# Patient Record
Sex: FEMALE | Race: WHITE | Employment: FULL TIME
[De-identification: names, ages, dates, MRNs, and addresses within clinical notes are randomized per-mention and may not be internally consistent; named-entity substitution may affect disease eponyms.]

---

## 2017-10-15 ENCOUNTER — HEALTH MAINTENANCE LETTER (OUTPATIENT)
Age: 37
End: 2017-10-15

## 2020-07-30 ENCOUNTER — MEDICAL CORRESPONDENCE (OUTPATIENT)
Dept: HEALTH INFORMATION MANAGEMENT | Facility: CLINIC | Age: 40
End: 2020-07-30

## 2020-09-28 ENCOUNTER — HOSPITAL ENCOUNTER (OUTPATIENT)
Dept: PHYSICAL THERAPY | Facility: CLINIC | Age: 40
Setting detail: THERAPIES SERIES
End: 2020-09-28
Attending: ORTHOPAEDIC SURGERY
Payer: COMMERCIAL

## 2020-09-28 PROCEDURE — 97162 PT EVAL MOD COMPLEX 30 MIN: CPT | Mod: GP | Performed by: PHYSICAL THERAPIST

## 2020-09-28 PROCEDURE — 97110 THERAPEUTIC EXERCISES: CPT | Mod: GP | Performed by: PHYSICAL THERAPIST

## 2020-09-28 NOTE — PROGRESS NOTES
"   09/28/20 0917   General Information   Type of Visit Initial OP Ortho PT Evaluation   Start of Care Date 09/28/20   Referring Physician Dr. Calvin Allen MD   Patient/Family Goals Statement \"Get the pain to go away\"    Orders Evaluate and Treat   Orders Comment 1-2 times per week, for 4-6 weeks.  Please send progress report.  Evaluate and treat patient as needed and initiate a home exercise program.   Date of Order 07/30/20   Certification Required? Yes   Medical Diagnosis Right knee, IT band friction syndrome   Surgical/Medical history reviewed Yes   Weight-Bearing Status - LUE full weight-bearing   Weight-Bearing Status - RUE full weight-bearing   Weight-Bearing Status - LLE full weight-bearing   Weight-Bearing Status - RLE full weight-bearing   General Information Comments No PMH on file.  Patient reports 13 knee surgeries (6 on R, 7 on L).       Present No   Body Part(s)   Body Part(s) Knee   Presentation and Etiology   Pertinent history of current problem (include personal factors and/or comorbidities that impact the POC) Patient reports pain that started in July, goes from outside of R knee all the way up to her hip.  She has a long history of knee pain, starting in middle school when she was playing floor hockey and was checked in her knee.  She reports it's genetic, as her father and grandparents had knee issues as well.  She injured her R knee in April 2019 when walking on a trail and stepped on an uneven part and dislocated the knee.  She popped it back it place (it had dislocated many times before, so she was used to popping it back in place).  Patient states she underwent R TKA in August 2019, but chart review indicates surgery took place on 6/28/2019.  She was seen for PT in [place] until about Feb-March 2020, said she tried many different things including a hinge brace and shock therapy (TENS maybe?).  Since her surgery she has not been able to straight or bend her knee all the " "way.  The most flexion she ever got was 95 deg, the most extension she ever got was 7 deg from 0.  She continues to have swelling and pain.  In July she started having pain shooting up the lateral aspect of her R leg and into her hip.  At first she thought it was a problem with the hip, but her doctor told her it was the IT band.  This is the first time she's had pain in this location, in the past it's always been confined to the knee.  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     Impairments A. Pain;B. Decreased WB tolerance;C. Swelling;D. Decreased ROM;E. Decreased flexibility;F. Decreased strength and endurance;G. Impaired balance;H. Impaired gait   Functional Limitations perform activities of daily living;perform required work activities;perform desired leisure / sports activities   Symptom Location R lateral joint line extending up lateral leg to R hip   How/Where did it occur Other  (Unknown)   Onset date of current episode/exacerbation 07/30/20   Chronicity Chronic   Pain rating (0-10 point scale) Best (/10);Worst (/10)  (Currently 4/10)   Best (/10) 4/10   Worst (/10) 10/10   Pain quality H. Other   Pain quality comment \"Feels like the knee swells to where I really start limping.  Pain is sharp\"   Frequency of pain/symptoms A. Constant   Pain/symptoms are: Other   Pain symptoms comment \"Gets worse throughout the day as I'm on it\"   Pain/symptoms exacerbated by M. Other   Pain exacerbation comment Walking and moving, stairs (bad going up and down), does not kneel or squat (has an order saying this)   Pain/symptoms eased by K. Other   Pain eased by comment Ice/heat, rubbing the area, ibuprofen    Progression of symptoms since onset: Worsened   Prior Level of Function   Prior Level of Function-Mobility Independent   Prior Level of Function-ADLs Independent   Current Level of Function   Patient role/employment history G. Disabled  (Was a CNA, but now on " "disability )   Living environment [facility]   Home/community accessibility Trailer house that is handicapped accessible (for boyfriend's uncle who lives with her and her boyfriend)   Current equipment-Gait/Locomotion None  (Supposed to be using a cane but lost it when she moved)   Current equipment-ADL None   Fall Risk Screen   Fall screen completed by PT   Have you fallen 2 or more times in the past year? Yes   Have you fallen and had an injury in the past year? Yes   Is patient a fall risk? Yes   Fall screen comments \"A lot of falls - the last one put me in a boot for 3 weeks (that was in August)\"   Functional Scales   Functional Scales Other   Other Scales  LEFS  (Score 32/80 (40%))   Knee Objective Findings   Side (if bilateral, select both right and left) Right   Observation Patient appears to be frustrated with her knee pain, frustrated with her lack of progress after her surgery, frustrated with the pain she is currently in.    Integumentary  Intact, surgical scar present on R knee   Gait/Locomotion Midly antalgic, decreased R knee extension, increased stance time R LE as compared to L LE, bilateral overpronation, R knee tends to turn inward (L knee does not)   Foot Position In Standing Bilateral overpronation with bilateral decreased arch height   Knee ROM Comment Clicking in R knee during knee flexion/extension when patient was sitting   Knee/Hip Strength Comments R hip flexion 4-/5 and painful when tested    Knee Special Test Comments Positive Jaleel's, Positive Sommer's compression test (both indicating IT band dysfunction)   Palpation Tender along R IT band, tender to palpation of R greater trochanter, R glutes, tender with palpation of R femoral condyle, tender to palpation of lateral joint line.  No tenderness with palpation of medial joint line or areas superior to patella   Accessory Motion/Joint Mobility Patella movement WNL   Right Knee Extension AROM -5 deg (sitting)   Right Knee Flexion AROM 74 " deg (sitting), 90 deg (supine)   Right Knee Flexion PROM 95 deg (supine), very painful    Right Knee Flexion Strength 4-/5 (within patient's range), painful when testing    Right Knee Extension Strength 4-/5 (within patient's range), painful when testing    Right Hip Abduction Strength 3/5   Right Hamstring Flexibility Increased tightness   Right Hip Flexor Flexibility Increased tightness   Right ITB Flexibility Increased tightness   Planned Therapy Interventions   Planned Therapy Interventions balance training;gait training;joint mobilization;manual therapy;neuromuscular re-education;ROM;strengthening;stretching   Planned Modality Interventions   Planned Modality Interventions Cryotherapy;Hot packs;Ultrasound;TENS   Planned Modality Interventions Comments Modalities as needed for symptom relief    Clinical Impression   Criteria for Skilled Therapeutic Interventions Met yes, treatment indicated   PT Diagnosis R LE pain, decreased R LE strength, increased muscle tightness, gait deviations, R knee swelling   Influenced by the following impairments R LE pain, decreased R LE strength, increased muscle tightness, gait deviations, R knee swelling   Functional limitations due to impairments Unable to return to work, unable to squat/kneel, difficulty with ambulation, standing for long periods, ascending/descending stairs   Clinical Presentation Evolving/Changing   Clinical Presentation Rationale Based on observation, history, evaluation and clinical judgment.    Clinical Decision Making (Complexity) Moderate complexity   Therapy Frequency 2 times/Week   Predicted Duration of Therapy Intervention (days/wks) 90 days    Risk & Benefits of therapy have been explained Yes   Patient, Family & other staff in agreement with plan of care Yes   Clinical Impression Comments Patient presents with signs and symptoms consistent with referring diagnosis of right knee pain and right IT band friction syndrome.  She demonstrates R LE pain,  decreased R LE strength, increased muscle tightness, gait deviations, R knee swelling.  Functionally, she has increased pain and difficulty with ascending/descending stairs, standing for long periods of time, ambulation, squatting/kneeling, and she is unable to return to work (as a CNA - is currently on disability).  Patient will benefit from skilled physical therapy interventions to address physical impairments for return to functional activities.    Education Assessment   Preferred Learning Style Listening;Reading;Demonstration;Pictures/video   Barriers to Learning No barriers   ORTHO GOALS   PT Ortho Eval Goals 1;2;3   Ortho Goal 1   Goal Identifier 1   Goal Description Patient will increase LEFS score from 32/80 (40%) to at least 72/80 in order to demonstrate at least 90% maximal function in order to facilitate return to prior level of function.     Target Date 12/27/20   Ortho Goal 2   Goal Identifier 2   Goal Description Patient will report R LE pain at < 2/10 with all activities in order to facilitate return to PLOF.   Target Date 12/27/20   Ortho Goal 3   Goal Identifier 3   Goal Description Patient will increase R knee extension AROM from 5 deg to 0 deg and will improve R knee flexion AROM from 90 deg to 110 deg (in supine) in order to facilitate return to PLOF.   Target Date 12/27/20   Total Evaluation Time   PT Eval, Moderate Complexity Minutes (68321) 50   Therapy Certification   Certification date from 09/28/20   Certification date to 12/27/20   Medical Diagnosis Right knee, IT band friction syndrome           Karma Grant, PT, DPT, CLT-ANÍBAL  [facility]  Physical Therapist     Phone: [phone]  Email: [email or web address]

## 2020-09-28 NOTE — PROGRESS NOTES
[facility]          OUTPATIENT PHYSICAL THERAPY ORTHOPEDIC EVALUATION  PLAN OF TREATMENT FOR OUTPATIENT REHABILITATION  (COMPLETE FOR INITIAL CLAIMS ONLY)  Patient's Last Name, First Name, M.I.  YOB: 1980  Talia Lemuse  DARBY    Provider s Name:  [facility]   Medical Record No.  [id number]   Start of Care Date:  09/28/20   Onset Date:  07/30/20   Type:     _X__PT   ___OT   ___SLP Medical Diagnosis:  Right knee, IT band friction syndrome     PT Diagnosis:  R LE pain, decreased R LE strength, increased muscle tightness, gait deviations, R knee swelling   Visits from SOC:  1      _________________________________________________________________________________  Plan of Treatment/Functional Goals:  balance training, gait training, joint mobilization, manual therapy, neuromuscular re-education, ROM, strengthening, stretching     Cryotherapy, Hot packs, Ultrasound, TENS  Modalities as needed for symptom relief   Goals  Goal Identifier: 1  Goal Description: Patient will increase LEFS score from 32/80 (40%) to at least 72/80 in order to demonstrate at least 90% maximal function in order to facilitate return to prior level of function.    Target Date: 12/27/20    Goal Identifier: 2  Goal Description: Patient will report R LE pain at < 2/10 with all activities in order to facilitate return to PLOF.  Target Date: 12/27/20    Goal Identifier: 3  Goal Description: Patient will increase R knee extension AROM from 5 deg to 0 deg and will improve R knee flexion AROM from 90 deg to 110 deg (in supine) in order to facilitate return to PLOF.  Target Date: 12/27/20       Therapy Frequency:  2 times/Week  Predicted Duration of Therapy Intervention:  90 days     Karma Grant, PT, DPT, CLT-ANÍBAL                 I CERTIFY THE NEED FOR THESE SERVICES FURNISHED UNDER        THIS PLAN OF TREATMENT AND WHILE UNDER MY CARE .             Physician Signature                Date    X_____________________________________________________                             Certification Date From:  09/28/20   Certification Date To:  12/27/20    Referring Provider:  Dr. Calvin Allen MD    Initial Assessment        See Epic Evaluation Start of Care Date: 09/28/20

## 2020-09-30 ENCOUNTER — HOSPITAL ENCOUNTER (OUTPATIENT)
Dept: PHYSICAL THERAPY | Facility: CLINIC | Age: 40
Setting detail: THERAPIES SERIES
End: 2020-09-30
Attending: ORTHOPAEDIC SURGERY
Payer: COMMERCIAL

## 2020-09-30 PROCEDURE — 97110 THERAPEUTIC EXERCISES: CPT | Mod: GP

## 2020-10-07 ENCOUNTER — HOSPITAL ENCOUNTER (OUTPATIENT)
Dept: PHYSICAL THERAPY | Facility: CLINIC | Age: 40
Setting detail: THERAPIES SERIES
End: 2020-10-07
Attending: ORTHOPAEDIC SURGERY
Payer: COMMERCIAL

## 2020-10-07 PROCEDURE — 97112 NEUROMUSCULAR REEDUCATION: CPT | Mod: GP | Performed by: PHYSICAL THERAPIST

## 2020-10-07 PROCEDURE — 97110 THERAPEUTIC EXERCISES: CPT | Mod: GP | Performed by: PHYSICAL THERAPIST

## 2020-10-12 ENCOUNTER — HOSPITAL ENCOUNTER (OUTPATIENT)
Dept: PHYSICAL THERAPY | Facility: CLINIC | Age: 40
Setting detail: THERAPIES SERIES
End: 2020-10-12
Attending: ORTHOPAEDIC SURGERY
Payer: COMMERCIAL

## 2020-10-12 PROCEDURE — 97140 MANUAL THERAPY 1/> REGIONS: CPT | Mod: GP | Performed by: PHYSICAL THERAPIST

## 2020-10-12 PROCEDURE — 97110 THERAPEUTIC EXERCISES: CPT | Mod: GP | Performed by: PHYSICAL THERAPIST

## 2020-10-12 PROCEDURE — 97112 NEUROMUSCULAR REEDUCATION: CPT | Mod: GP | Performed by: PHYSICAL THERAPIST

## 2020-10-14 ENCOUNTER — HOSPITAL ENCOUNTER (OUTPATIENT)
Dept: PHYSICAL THERAPY | Facility: CLINIC | Age: 40
Setting detail: THERAPIES SERIES
End: 2020-10-14
Attending: ORTHOPAEDIC SURGERY
Payer: COMMERCIAL

## 2020-10-14 PROCEDURE — 97140 MANUAL THERAPY 1/> REGIONS: CPT | Mod: GP | Performed by: PHYSICAL THERAPIST

## 2020-10-14 PROCEDURE — 97110 THERAPEUTIC EXERCISES: CPT | Mod: GP | Performed by: PHYSICAL THERAPIST

## 2020-10-19 ENCOUNTER — HOSPITAL ENCOUNTER (OUTPATIENT)
Dept: PHYSICAL THERAPY | Facility: CLINIC | Age: 40
Setting detail: THERAPIES SERIES
End: 2020-10-19
Attending: ORTHOPAEDIC SURGERY
Payer: COMMERCIAL

## 2020-10-19 PROCEDURE — 97140 MANUAL THERAPY 1/> REGIONS: CPT | Mod: GP | Performed by: PHYSICAL THERAPIST

## 2020-10-19 PROCEDURE — 97110 THERAPEUTIC EXERCISES: CPT | Mod: GP | Performed by: PHYSICAL THERAPIST

## 2020-11-16 ENCOUNTER — MEDICAL CORRESPONDENCE (OUTPATIENT)
Dept: HEALTH INFORMATION MANAGEMENT | Facility: CLINIC | Age: 40
End: 2020-11-16

## 2020-11-17 ENCOUNTER — HOSPITAL ENCOUNTER (OUTPATIENT)
Dept: PHYSICAL THERAPY | Facility: CLINIC | Age: 40
Setting detail: THERAPIES SERIES
End: 2020-11-17
Attending: ORTHOPAEDIC SURGERY
Payer: COMMERCIAL

## 2020-11-17 PROCEDURE — 97161 PT EVAL LOW COMPLEX 20 MIN: CPT | Mod: GP

## 2020-11-17 PROCEDURE — 97110 THERAPEUTIC EXERCISES: CPT | Mod: GP

## 2020-11-17 NOTE — PROGRESS NOTES
[facility]          OUTPATIENT PHYSICAL THERAPY ORTHOPEDIC EVALUATION  PLAN OF TREATMENT FOR OUTPATIENT REHABILITATION  (COMPLETE FOR INITIAL CLAIMS ONLY)  Patient's Last Name, First Name, M.I.  YOB: 1980  Eneida Lemus    Provider s Name:  [facility]   Medical Record No.  [id number]   Start of Care Date:  11/17/20   Onset Date:  11/16/20   Type:     _X__PT   ___OT   ___SLP Medical Diagnosis:  s/p right arthroscopic debridement of arthrofibrosis, lateral release of patella and cortisone injection     PT Diagnosis:  s/p R knee arthroscopic debridgement w/decreased RLE strength, impaired gait mechanics, decreased proprioception, decreased R knee ROM, increased tissue tightness, and decreased activity tolerance.   Visits from SOC:  1      _________________________________________________________________________________  Plan of Treatment/Functional Goals:  balance training, gait training, joint mobilization, manual therapy, neuromuscular re-education, ROM, strengthening, stretching  as indicated  Cryotherapy, Hot packs, TENS, Ultrasound  as needed  Goals  Goal Identifier: LEFS  Goal Description: Patient will increase LEFS score from 40/80 (50%) to at least 72/80 in order to demonstrate at least 90% maximal function in order to facilitate return to prior level of function.    Target Date: 02/14/21    Goal Identifier: HEP  Goal Description: Pt will demonstrate accurate compliance to HEP >4 days per week to faciliate safe discharge from PT  Target Date: 02/14/21    Goal Identifier: Pain  Goal Description: Pt will report knee pain consistently <2/10 with all activities to show improved activity tolerance to return to prior level of function  Target Date: 02/14/21    Goal Identifier: Ambulation  Goal Description: Pt will be able to ambulate for >30 min w/symmetrical gait pattern and no assistive device to be able to comfortably run errands  Target Date:  02/14/21    Therapy Frequency:  2 times/Week  Predicted Duration of Therapy Intervention:  3 months    Gena Franco, PT                 I CERTIFY THE NEED FOR THESE SERVICES FURNISHED UNDER        THIS PLAN OF TREATMENT AND WHILE UNDER MY CARE .             Physician Signature               Date    X_____________________________________________________                             Certification Date From:  11/17/20   Certification Date To:  02/14/21    Referring Provider:  Calvin Allen MD    Initial Assessment        See Epic Evaluation Start of Care Date: 11/17/20

## 2020-11-17 NOTE — PROGRESS NOTES
11/17/20 0930   General Information   Type of Visit Initial OP Ortho PT Evaluation   Start of Care Date 11/17/20   Referring Physician Calvin Allen MD   Patient/Family Goals Statement get rid of knee pain and be able to do what I need to do   Orders Evaluate and Treat   Orders Comment daily therapy x2wks, 2-3x per wk x2 wks. Please send progress report   Date of Order 11/16/20   Certification Required? Yes   Medical Diagnosis s/p right arthroscopic debridement of arthrofibrosis, lateral release of patella and cortisone injection   Surgical/Medical history reviewed Yes   Precautions/Limitations no known precautions/limitations   Weight-Bearing Status - LUE full weight-bearing   Weight-Bearing Status - RUE full weight-bearing   Weight-Bearing Status - LLE full weight-bearing   Weight-Bearing Status - RLE weight-bearing as tolerated   Special Instructions No squatting, no kneeling, general TKA restrictions as reported by pt   General Information Comments PMH/PSH: not on file but pt reports 14 knee surgeries at this time. Most recently R arthroscopic surgery on R TKA from last year       Present No   Body Part(s)   Body Part(s) Knee   Presentation and Etiology   Pertinent history of current problem (include personal factors and/or comorbidities that impact the POC) Pt had R TKA last year. Thought it was going well initially but had two manipulations after that. Did further PT to help with pain and motion, did not help. Had arthroscopic surgery yesterday (11/16/2020).   Impairments A. Pain;C. Swelling;B. Decreased WB tolerance;G. Impaired balance;H. Impaired gait;F. Decreased strength and endurance;D. Decreased ROM;E. Decreased flexibility   Functional Limitations perform activities of daily living;perform required work activities;perform desired leisure / sports activities   Symptom Location R knee, generally whole thing today and some pain going up lateral R thigh   How/Where did it occur    (surgery)   Onset date of current episode/exacerbation 11/16/20   Chronicity New   Pain rating (0-10 point scale)   (currently 3/10)   Worst (/10) 5-6/10 prior to surgery   Pain quality A. Sharp;C. Aching   Frequency of pain/symptoms A. Constant   Pain/symptoms are: The same all the time   Pain/symptoms exacerbated by B. Walking;G. Certain positions   Pain/symptoms eased by I. OTC medication(s);H. Cold;E. 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 Yes   Have you fallen and had an injury in the past year? Yes   Is patient a fall risk? Yes;Department fall risk interventions implemented   Fall screen comments Pt reports general trips and falls, sometimes related to knee pain, other times she isn't sure what causes them   Abuse Screen (yes response referral indicated)   Feels Unsafe at Home or Work/School no   Feels Threatened by Someone no   Does Anyone Try to Keep You From Having Contact with Others or Doing Things Outside Your Home? no   Physical Signs of Abuse Present no   Functional Scales   Other Scales  LEFS  (40/80)   Knee Objective Findings   Side (if bilateral, select both right and left) Right   Observation Pt reports that she feels nauseous today from the surgery. Ambulating w/o assistive device although she reports that the surgeon said she should be using a cane after this surgery.    Integumentary  R knee wrapped in ace wrap, inspected above  and below, no increased redness, some swelling but nothing more than anticipated   Gait/Locomotion decreased stance time on RLE, mildly antalgic, decreased R knee ROM (somewhat limited due to ace wrap)   Foot Position In Standing bilateral over pronation   Right Knee Extension AROM lacking 8 deg   Right Knee Flexion AROM 86 deg in supine, very tight feeling   Knee ROM Comment ROM limited due to ace wrap in place   Right Knee Flexion Strength not tested due to surgery being yesterday   Right Knee Extension Strength not tested due to surgery being yesterday   Right Hip Abduction Strength not formally tested but difficulty with sidelying hip abd, increased hip flex compensation   Right Hamstring Flexibility increased tightness   Right Hip Flexor Flexibility increased tightness   Knee Special Test Comments Special tests not done due to post surgical status   Palpation Slightly tender along R ITB, gentle pressure on wrap at knee was okay   Right Knee Extension PROM lacking 5 deg   Right Quad Set Strength able to complete SLR for 5 reps before demonstrating ext lag   Posture weight shifted onto LLE, rounded shoulders, forward head position   Balance/Proprioception (Single Leg Stance) SLS not tested due to post surgical status, tandem frankie required light UE support to maintain   R VMO Strength decreased   Right Gastrocnemius Flexibility WFL   Right Quadricep Flexibility not tested due to pain with knee flexion but anticipate increased tightness   Planned Therapy Interventions   Planned Therapy Interventions balance training;gait training;joint mobilization;manual therapy;neuromuscular re-education;ROM;strengthening;stretching   Planned Therapy Interventions Comment as indicated   Planned Modality Interventions   Planned Modality Interventions Cryotherapy;Hot packs;TENS;Ultrasound   Planned Modality Interventions Comments as needed   Clinical Impression   Criteria for Skilled Therapeutic Interventions Met yes, treatment  indicated   PT Diagnosis s/p R knee arthroscopic debridgement w/decreased RLE strength, impaired gait mechanics, decreased proprioception, decreased R knee ROM, increased tissue tightness, and decreased activity tolerance.   Influenced by the following impairments s/p R knee arthroscopic debridgement w/decreased RLE strength, impaired gait mechanics, decreased proprioception, decreased R knee ROM, increased tissue tightness, and decreased activity tolerance.   Functional limitations due to impairments walking longer distances, working, standing, sitting for longer periods   Clinical Presentation Evolving/Changing   Clinical Presentation Rationale Based on observation, history, evaluation and clinical judgment.    Clinical Decision Making (Complexity) Moderate complexity   Therapy Frequency 2 times/Week   Predicted Duration of Therapy Intervention (days/wks) 3 months   Risk & Benefits of therapy have been explained Yes   Patient, Family & other staff in agreement with plan of care Yes   Clinical Impression Comments Pt is a 40 year old female that presents 1 day s/p R arthroscopic debridgement of arthrofibrosis, lateral release of patella, and cortisone injection. This is about a year after the original TKA and 2 manipulations. Pt is one day post op, ace wrap still in place. Reports tolerable pain levels at this time. Ambulating w/o assistive device. Pt presents with decreased R knee ROM, RLE weakness, tissue tightness, impaired gait mechaincs, decreased proprioception, and decreased activity tolerance. Pt will benefit from skilled PT intervention to address these impairments and improve her level of function   Education Assessment   Preferred Learning Style Listening;Reading;Demonstration;Pictures/video   Barriers to Learning No barriers   ORTHO GOALS   PT Ortho Eval Goals 1;2;3;4   Ortho Goal 1   Goal Identifier LEFS   Goal Description Patient will increase LEFS score from 40/80 (50%) to at least 72/80 in order to  demonstrate at least 90% maximal function in order to facilitate return to prior level of function.     Target Date 02/14/21   Ortho Goal 2   Goal Identifier HEP   Goal Description Pt will demonstrate accurate compliance to HEP >4 days per week to faciliate safe discharge from PT   Target Date 02/14/21   Ortho Goal 3   Goal Identifier Pain   Goal Description Pt will report knee pain consistently <2/10 with all activities to show improved activity tolerance to return to prior level of function   Target Date 02/14/21   Ortho Goal 4   Goal Identifier Ambulation   Goal Description Pt will be able to ambulate for >30 min w/symmetrical gait pattern and no assistive device to be able to comfortably run errands   Target Date 02/14/21   Total Evaluation Time   PT Eval, Low Complexity Minutes (51735) 15   Therapy Certification   Certification date from 11/17/20   Certification date to 02/14/21   Medical Diagnosis s/p right arthroscopic debridement of arthrofibrosis, lateral release of patella and cortisone injection     Gena Franco, PT, DPT  [phone]  [facility]  Thank you for this referral

## 2020-11-24 ENCOUNTER — HOSPITAL ENCOUNTER (OUTPATIENT)
Dept: PHYSICAL THERAPY | Facility: CLINIC | Age: 40
Setting detail: THERAPIES SERIES
End: 2020-11-24
Attending: ORTHOPAEDIC SURGERY
Payer: COMMERCIAL

## 2020-11-24 PROCEDURE — 97110 THERAPEUTIC EXERCISES: CPT | Mod: GP | Performed by: PHYSICAL THERAPIST

## 2020-11-27 ENCOUNTER — HOSPITAL ENCOUNTER (OUTPATIENT)
Dept: PHYSICAL THERAPY | Facility: CLINIC | Age: 40
Setting detail: THERAPIES SERIES
End: 2020-11-27
Attending: ORTHOPAEDIC SURGERY
Payer: COMMERCIAL

## 2020-11-27 PROCEDURE — 97110 THERAPEUTIC EXERCISES: CPT | Mod: GP

## 2020-12-03 ENCOUNTER — HOSPITAL ENCOUNTER (OUTPATIENT)
Dept: PHYSICAL THERAPY | Facility: CLINIC | Age: 40
Setting detail: THERAPIES SERIES
End: 2020-12-03
Attending: ORTHOPAEDIC SURGERY
Payer: COMMERCIAL

## 2020-12-03 PROCEDURE — 97110 THERAPEUTIC EXERCISES: CPT | Mod: GP

## 2020-12-03 PROCEDURE — 97112 NEUROMUSCULAR REEDUCATION: CPT | Mod: GP

## 2020-12-10 ENCOUNTER — HOSPITAL ENCOUNTER (OUTPATIENT)
Dept: PHYSICAL THERAPY | Facility: CLINIC | Age: 40
Setting detail: THERAPIES SERIES
End: 2020-12-10
Attending: ORTHOPAEDIC SURGERY
Payer: COMMERCIAL

## 2020-12-10 PROCEDURE — 97110 THERAPEUTIC EXERCISES: CPT | Mod: GP

## 2020-12-17 ENCOUNTER — HOSPITAL ENCOUNTER (OUTPATIENT)
Dept: PHYSICAL THERAPY | Facility: CLINIC | Age: 40
Setting detail: THERAPIES SERIES
End: 2020-12-17
Attending: ORTHOPAEDIC SURGERY
Payer: COMMERCIAL

## 2020-12-17 PROCEDURE — 97110 THERAPEUTIC EXERCISES: CPT | Mod: GP

## 2020-12-17 PROCEDURE — 97530 THERAPEUTIC ACTIVITIES: CPT | Mod: GP

## 2020-12-29 ENCOUNTER — HOSPITAL ENCOUNTER (OUTPATIENT)
Dept: PHYSICAL THERAPY | Facility: CLINIC | Age: 40
Setting detail: THERAPIES SERIES
End: 2020-12-29
Attending: ORTHOPAEDIC SURGERY
Payer: COMMERCIAL

## 2020-12-29 PROCEDURE — 97110 THERAPEUTIC EXERCISES: CPT | Mod: GP

## 2020-12-31 ENCOUNTER — HOSPITAL ENCOUNTER (OUTPATIENT)
Dept: PHYSICAL THERAPY | Facility: CLINIC | Age: 40
Setting detail: THERAPIES SERIES
End: 2020-12-31
Attending: ORTHOPAEDIC SURGERY
Payer: COMMERCIAL

## 2020-12-31 PROCEDURE — 97140 MANUAL THERAPY 1/> REGIONS: CPT | Mod: GP

## 2020-12-31 PROCEDURE — 97110 THERAPEUTIC EXERCISES: CPT | Mod: GP

## 2021-01-05 ENCOUNTER — HOSPITAL ENCOUNTER (OUTPATIENT)
Dept: PHYSICAL THERAPY | Facility: CLINIC | Age: 41
Setting detail: THERAPIES SERIES
End: 2021-01-05
Attending: ORTHOPAEDIC SURGERY
Payer: COMMERCIAL

## 2021-01-05 PROCEDURE — 97110 THERAPEUTIC EXERCISES: CPT | Mod: GP | Performed by: PHYSICAL THERAPIST

## 2021-01-05 PROCEDURE — 97140 MANUAL THERAPY 1/> REGIONS: CPT | Mod: GP | Performed by: PHYSICAL THERAPIST

## 2021-01-07 ENCOUNTER — HOSPITAL ENCOUNTER (OUTPATIENT)
Dept: PHYSICAL THERAPY | Facility: CLINIC | Age: 41
Setting detail: THERAPIES SERIES
End: 2021-01-07
Attending: ORTHOPAEDIC SURGERY
Payer: COMMERCIAL

## 2021-01-07 PROCEDURE — 97110 THERAPEUTIC EXERCISES: CPT | Mod: GP

## 2021-01-07 PROCEDURE — 97140 MANUAL THERAPY 1/> REGIONS: CPT | Mod: GP

## 2021-01-12 ENCOUNTER — HOSPITAL ENCOUNTER (OUTPATIENT)
Dept: PHYSICAL THERAPY | Facility: CLINIC | Age: 41
Setting detail: THERAPIES SERIES
End: 2021-01-12
Attending: ORTHOPAEDIC SURGERY
Payer: COMMERCIAL

## 2021-01-12 PROCEDURE — 97110 THERAPEUTIC EXERCISES: CPT | Mod: GP

## 2021-01-14 ENCOUNTER — HOSPITAL ENCOUNTER (OUTPATIENT)
Dept: PHYSICAL THERAPY | Facility: CLINIC | Age: 41
Setting detail: THERAPIES SERIES
End: 2021-01-14
Attending: ORTHOPAEDIC SURGERY
Payer: COMMERCIAL

## 2021-01-14 PROCEDURE — 97110 THERAPEUTIC EXERCISES: CPT | Mod: GP

## 2021-01-14 PROCEDURE — 97112 NEUROMUSCULAR REEDUCATION: CPT | Mod: GP

## 2021-01-19 ENCOUNTER — HOSPITAL ENCOUNTER (OUTPATIENT)
Dept: PHYSICAL THERAPY | Facility: CLINIC | Age: 41
Setting detail: THERAPIES SERIES
End: 2021-01-19
Attending: ORTHOPAEDIC SURGERY
Payer: COMMERCIAL

## 2021-01-19 PROCEDURE — 97110 THERAPEUTIC EXERCISES: CPT | Mod: GP,95,GT

## 2021-01-19 NOTE — PROGRESS NOTES
Eneida Lemus is a 40 year old female who is being seen via a billable video visit.      Patient has given verbal consent for Video visit? Yes    Video Start Time: 9:32    Telehealth Visit Details    Type of Service:  Telehealth    Video End Time (time video stopped): 10::02    Originating Location (pt. location): Home    Additional Participants in Telehealth Visit: n/a    Distant Location (provider location):  [facility]     Mode of Communication (Audio Visual or Audio Only):  audio and visual    Gena Franco, PT  January 19, 2021

## 2021-01-26 ENCOUNTER — HOSPITAL ENCOUNTER (OUTPATIENT)
Dept: PHYSICAL THERAPY | Facility: CLINIC | Age: 41
Setting detail: THERAPIES SERIES
End: 2021-01-26
Attending: ORTHOPAEDIC SURGERY
Payer: COMMERCIAL

## 2021-01-26 PROCEDURE — 97110 THERAPEUTIC EXERCISES: CPT | Mod: GP,95,GT

## 2021-01-26 NOTE — PROGRESS NOTES
Eneida Lemus is a 40 year old female who is being seen via a billable video visit.      Patient has given verbal consent for Video visit? Yes    Video Start Time: 9:30    Telehealth Visit Details    Type of Service:  Telehealth    Video End Time (time video stopped): 9:58    Originating Location (pt. location): Home    Additional Participants in Telehealth Visit: n/a    Distant Location (provider location):  [facility]     Mode of Communication (Audio Visual or Audio Only):  audio and visual    Gena Franco, PT  January 26, 2021

## 2021-01-28 ENCOUNTER — HOSPITAL ENCOUNTER (OUTPATIENT)
Dept: PHYSICAL THERAPY | Facility: CLINIC | Age: 41
Setting detail: THERAPIES SERIES
End: 2021-01-28
Attending: ORTHOPAEDIC SURGERY
Payer: COMMERCIAL

## 2021-01-28 PROCEDURE — 97110 THERAPEUTIC EXERCISES: CPT | Mod: GP

## 2021-02-18 ENCOUNTER — HOSPITAL ENCOUNTER (OUTPATIENT)
Dept: PHYSICAL THERAPY | Facility: CLINIC | Age: 41
Setting detail: THERAPIES SERIES
End: 2021-02-18
Attending: ORTHOPAEDIC SURGERY
Payer: COMMERCIAL

## 2021-02-18 PROCEDURE — 97110 THERAPEUTIC EXERCISES: CPT | Mod: GP | Performed by: PHYSICAL THERAPIST

## 2021-02-18 NOTE — PROGRESS NOTES
Outpatient Physical Therapy Progress Note     Patient: Eneida Lemus  : 1980    Beginning/End Dates of Reporting Period:  21 to 2021    Referring Provider: Calvin Allen MD  Therapy Diagnosis: S/p R TKA     Client Self Report: (P) Pt had CT scan Pt reports she will be following up with orthopedic surgeon next .  Pt reports she has had soreness and stiffness in knee.      Objective Measurements:        Objective Measure: Pain  Details: 8/10  Objective Measure: R knee AROM  Details: 0-5-98                        Outcome Measures (most recent score):  LEFS:     Goals:  Goal Identifier LEFS   Goal Description Patient will increase LEFS score from 40/80 (50%) to at least 72/80 in order to demonstrate at least 90% maximal function in order to facilitate return to prior level of function.     Target Date 21   Date Met  (P) (14/ on )   Progress:     Goal Identifier HEP   Goal Description Pt will demonstrate accurate compliance to HEP >4 days per week to faciliate safe discharge from PT   Target Date 21   Date Met  21   Progress:     Goal Identifier Pain   Goal Description Pt will report knee pain consistently <2/10 with all activities to show improved activity tolerance to return to prior level of function   Target Date 21   Date Met  (Increased in pain since reinjury several weeks ago.)   Progress:     Goal Identifier Ambulation   Goal Description Pt will be able to ambulate for >30 min w/symmetrical gait pattern and no assistive device to be able to comfortably run errands   Target Date 21   Date Met  (Had demonstrated improvement prior to reinjury.)   Progress:     Goal Identifier     Goal Description     Target Date     Date Met      Progress:     Goal Identifier     Goal Description     Target Date     Date Met      Progress:     Goal Identifier     Goal Description     Target Date     Date Met      Progress:     Goal Identifier     Goal  Description     Target Date     Date Met      Progress:     Progress Toward Goals:   Progress limited due to reinjury of knee.      Plan:  Continue therapy per current plan of care.  Pt to follow up with surgeon to assess extent of reinjury and determine if new TKA is necessary.

## 2021-02-18 NOTE — PROGRESS NOTES
[facility]          OUTPATIENT PHYSICAL THERAPY ORTHOPEDIC EVALUATION  PLAN OF TREATMENT FOR OUTPATIENT REHABILITATION  (COMPLETE FOR INITIAL CLAIMS ONLY)  Patient's Last Name, First Name, M.I.  YOB: 1980  Eneida Lemus    Provider s Name:  [facility]   Medical Record No.  [id number]   Start of Care Date:   11/17/20   Onset Date:   11/16/20   Type:     _X__PT   ___OT   ___SLP Medical Diagnosis:   s/p right arthroscopic debridement of arthrofibrosis, lateral release of patella and cortisone injection     PT Diagnosis:  s/p R knee arthroscopic debridgement w/decreased RLE strength, impaired gait mechanics, decreased proprioception, decreased R knee ROM, increased tissue tightness, and decreased activity tolerance.   Visits from [facility]:  16      _________________________________________________________________________________  Plan of Treatment/Functional Goals:              Goals  Goal Identifier: LEFS  Goal Description: Patient will increase LEFS score from 40/80 (50%) to at least 72/80 in order to demonstrate at least 90% maximal function in order to facilitate return to prior level of function.    Target Date: 02/14/21  Current: 14/80    Goal Identifier: HEP  Goal Description: Pt will demonstrate accurate compliance to HEP >4 days per week to faciliate safe discharge from PT  Target Date: 02/14/21 MET    Goal Identifier: Pain  Goal Description: Pt will report knee pain consistently <2/10 with all activities to show improved activity tolerance to return to prior level of function  Target Date: 02/14/21   Current: Pt reports 8/10 intermittently since re-injury.    Goal Identifier: Ambulation  Goal Description: Pt will be able to ambulate for >30 min w/symmetrical gait pattern and no assistive device to be able to comfortably run errands  Target Date: 02/14/21.  Had been making progress prior to reinjury.                Therapy Frequency:    1x/week.  Predicted Duration of Therapy Intervention:   8 weeks    Darrick Burnett, PT                 I CERTIFY THE NEED FOR THESE SERVICES FURNISHED UNDER        THIS PLAN OF TREATMENT AND WHILE UNDER MY CARE .             Physician Signature               Date    X_____________________________________________________                             Certification Date From:    2/16/21  Certification Date To:   3/30/21    Referring Provider:   Dr. Calvin Allen    Initial Assessment        See Epic Evaluation

## 2021-03-04 NOTE — PROGRESS NOTES
Outpatient Physical Therapy Discharge Note     Patient: Eneida Lemus  : 1980    Beginning/End Dates of Reporting Period:  2020 to 3/4/2021    Referring Provider: Calvin Allen MD    Therapy Diagnosis: s/p R knee arthroscopic debridgement w/decreased RLE strength, impaired gait mechanics, decreased proprioception, decreased R knee ROM, increased tissue tightness, and decreased activity tolerance.     Client Self Report: Pt had CT scan Pt reports she will be following up with orthopedic surgeon next .  Pt reports she has had soreness and stiffness in knee.      Objective Measurements:        Objective Measure: Pain  Details: 8/10  Objective Measure: R knee AROM  Details: 0-5-98    Goals:  Goal Identifier LEFS   Goal Description Patient will increase LEFS score from 40/80 (50%) to at least 72/80 in order to demonstrate at least 90% maximal function in order to facilitate return to prior level of function.     Target Date 21   Date Met  (14 on )   Progress:     Goal Identifier HEP   Goal Description Pt will demonstrate accurate compliance to HEP >4 days per week to faciliate safe discharge from PT   Target Date 21   Date Met  21   Progress:     Goal Identifier Pain   Goal Description Pt will report knee pain consistently <2/10 with all activities to show improved activity tolerance to return to prior level of function   Target Date 21   Date Met  (Increased in pain since reinjury several weeks ago.)   Progress:     Goal Identifier Ambulation   Goal Description Pt will be able to ambulate for >30 min w/symmetrical gait pattern and no assistive device to be able to comfortably run errands   Target Date 21   Date Met  (Had demonstrated improvement prior to reinjury.)   Progress:     Progress Toward Goals:   Progress this reporting period: See previous note for progress summary. Pt will be undergoing open knee surgery later this month for ligament  adjustments, patellar tracking, and stability. She reports that she will be in a knee immobilizer for quite some time. She will return to PT once appropriate but will need a new eval at that time.     Plan:  Discharge from therapy.    Discharge:    Reason for Discharge: Pt is undergoing open knee surgery on 3/15. Will be in knee immobilizer then re-start PT when appropriate    Equipment Issued: HEP    Discharge Plan: Other services: Surgery.

## 2021-03-30 ENCOUNTER — MEDICAL CORRESPONDENCE (OUTPATIENT)
Dept: HEALTH INFORMATION MANAGEMENT | Facility: CLINIC | Age: 41
End: 2021-03-30

## 2021-04-07 ENCOUNTER — HOSPITAL ENCOUNTER (OUTPATIENT)
Dept: PHYSICAL THERAPY | Facility: CLINIC | Age: 41
Setting detail: THERAPIES SERIES
End: 2021-04-07
Attending: ORTHOPAEDIC SURGERY
Payer: COMMERCIAL

## 2021-04-07 PROCEDURE — 97161 PT EVAL LOW COMPLEX 20 MIN: CPT | Mod: GP

## 2021-04-07 PROCEDURE — 97110 THERAPEUTIC EXERCISES: CPT | Mod: GP

## 2021-04-07 NOTE — PROGRESS NOTES
[facility]          OUTPATIENT PHYSICAL THERAPY ORTHOPEDIC EVALUATION  PLAN OF TREATMENT FOR OUTPATIENT REHABILITATION  (COMPLETE FOR INITIAL CLAIMS ONLY)  Patient's Last Name, First Name, M.I.  YOB: 1980  Eneida Armenta    Provider s Name:  [facility]   Medical Record No.  [id number]   Start of Care Date:  04/07/21   Onset Date:  03/15/21   Type:     _X__PT   ___OT   ___SLP Medical Diagnosis:  s/p R knee open retinacular release w/plication repair of medial patellar ligament     PT Diagnosis:  R knee pain, decreased ROM, decreased strength, impaired gait mechaincs, impaired proprioception, and decreased activity tolerance   Visits from [facility]:  1      _________________________________________________________________________________  Plan of Treatment/Functional Goals:  balance training, gait training, manual therapy, neuromuscular re-education, ROM, strengthening, stretching, transfer training  as needed  Cryotherapy, Hot packs, Electrical stimulation, TENS, Ultrasound  as needed  Goals  Goal Identifier: HEP  Goal Description: Pt will demonstrate accurate compliance to HEP >4 days per wk to independently progress function and manage symptoms  Target Date: 10/03/21    Goal Identifier: LEFS  Goal Description: Pt will improve score on LEFS to >65/80 to show a clinically significant improvement in tolerance for daily activity  Target Date: 10/03/21    Goal Identifier: Ambulation  Goal Description: Pt will tolerate ambulating w/o assistive device for >30 min to be able to go grocery shopping comfortably  Target Date: 08/05/21    Goal Identifier: SLS  Goal Description: Pt will tolerate SLS for >30 sec to show improved proprioception and strength on RLE to support the knee and prevent further injury  Target Date: 08/05/21    Goal Identifier: ROM  Goal Description: Pt will demonstrate R knee ROM of at least 0-0-120 to allow for functional motion  for sitting and ascending/descending the stairs  Target Date: 08/05/21    Goal Identifier: SLR  Goal Description: Pt will demonstrate >20 reps of SLR w/o knee ext lag to show improved quad strength and coordination to be able to progress towards functional strengthening  Target Date: 05/07/21      Therapy Frequency:  2 times/Week  Predicted Duration of Therapy Intervention:  6 months    Gena Franco, PT                 I CERTIFY THE NEED FOR THESE SERVICES FURNISHED UNDER        THIS PLAN OF TREATMENT AND WHILE UNDER MY CARE .             Physician Signature               Date    X_____________________________________________________                             Certification Date From:  04/07/21   Certification Date To:  07/05/21    Referring Provider:  Calvin Allen MD    Initial Assessment        See Epic Evaluation Start of Care Date: 04/07/21
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
## 2021-04-09 ENCOUNTER — HOSPITAL ENCOUNTER (OUTPATIENT)
Dept: PHYSICAL THERAPY | Facility: CLINIC | Age: 41
Setting detail: THERAPIES SERIES
End: 2021-04-09
Attending: ORTHOPAEDIC SURGERY
Payer: COMMERCIAL

## 2021-04-09 PROCEDURE — 97530 THERAPEUTIC ACTIVITIES: CPT | Mod: GP

## 2021-04-09 PROCEDURE — 97110 THERAPEUTIC EXERCISES: CPT | Mod: GP

## 2021-04-12 ENCOUNTER — HOSPITAL ENCOUNTER (OUTPATIENT)
Dept: PHYSICAL THERAPY | Facility: CLINIC | Age: 41
Setting detail: THERAPIES SERIES
End: 2021-04-12
Attending: ORTHOPAEDIC SURGERY
Payer: COMMERCIAL

## 2021-04-12 PROCEDURE — 97116 GAIT TRAINING THERAPY: CPT | Mod: GP

## 2021-04-12 PROCEDURE — 97110 THERAPEUTIC EXERCISES: CPT | Mod: GP

## 2021-04-15 ENCOUNTER — HOSPITAL ENCOUNTER (OUTPATIENT)
Dept: PHYSICAL THERAPY | Facility: CLINIC | Age: 41
Setting detail: THERAPIES SERIES
End: 2021-04-15
Attending: ORTHOPAEDIC SURGERY
Payer: COMMERCIAL

## 2021-04-15 PROCEDURE — 97110 THERAPEUTIC EXERCISES: CPT | Mod: GP

## 2021-04-15 PROCEDURE — 97140 MANUAL THERAPY 1/> REGIONS: CPT | Mod: GP

## 2021-04-27 ENCOUNTER — HOSPITAL ENCOUNTER (OUTPATIENT)
Dept: PHYSICAL THERAPY | Facility: CLINIC | Age: 41
Setting detail: THERAPIES SERIES
End: 2021-04-27
Attending: ORTHOPAEDIC SURGERY
Payer: COMMERCIAL

## 2021-04-27 PROCEDURE — 97110 THERAPEUTIC EXERCISES: CPT | Mod: GP

## 2021-04-30 ENCOUNTER — HOSPITAL ENCOUNTER (OUTPATIENT)
Dept: PHYSICAL THERAPY | Facility: CLINIC | Age: 41
Setting detail: THERAPIES SERIES
End: 2021-04-30
Attending: ORTHOPAEDIC SURGERY
Payer: COMMERCIAL

## 2021-04-30 PROCEDURE — 97110 THERAPEUTIC EXERCISES: CPT | Mod: GP

## 2021-05-03 ENCOUNTER — HOSPITAL ENCOUNTER (OUTPATIENT)
Dept: PHYSICAL THERAPY | Facility: CLINIC | Age: 41
Setting detail: THERAPIES SERIES
End: 2021-05-03
Attending: ORTHOPAEDIC SURGERY
Payer: COMMERCIAL

## 2021-05-03 PROCEDURE — 97530 THERAPEUTIC ACTIVITIES: CPT | Mod: GP,95

## 2021-05-03 PROCEDURE — 97110 THERAPEUTIC EXERCISES: CPT | Mod: GP,GT,95

## 2021-05-03 NOTE — PROGRESS NOTES
Eneida Armenta is a 40 year old female who is being seen via a billable video visit.      Patient has given verbal consent for Video visit? Yes    Video Start Time: 1:49    Telehealth Visit Details    Type of Service:  Telehealth    Video End Time (time video stopped): 2:18    Originating Location (pt. location): Home    Additional Participants in Telehealth Visit: pt's son helped hold camera    Distant Location (provider location):  [facility]     Mode of Communication (Audio Visual or Audio Only):  audio and visual    Gena Franco, PT  May 3, 2021

## 2021-05-06 ENCOUNTER — HOSPITAL ENCOUNTER (OUTPATIENT)
Dept: PHYSICAL THERAPY | Facility: CLINIC | Age: 41
Setting detail: THERAPIES SERIES
End: 2021-05-06
Attending: ORTHOPAEDIC SURGERY
Payer: COMMERCIAL

## 2021-05-06 PROCEDURE — 97110 THERAPEUTIC EXERCISES: CPT | Mod: GP

## 2021-05-06 PROCEDURE — 97140 MANUAL THERAPY 1/> REGIONS: CPT | Mod: GP

## 2021-05-17 ENCOUNTER — HOSPITAL ENCOUNTER (OUTPATIENT)
Dept: PHYSICAL THERAPY | Facility: CLINIC | Age: 41
Setting detail: THERAPIES SERIES
End: 2021-05-17
Attending: ORTHOPAEDIC SURGERY
Payer: COMMERCIAL

## 2021-05-17 PROCEDURE — 97140 MANUAL THERAPY 1/> REGIONS: CPT | Mod: GP

## 2021-05-17 PROCEDURE — 97110 THERAPEUTIC EXERCISES: CPT | Mod: GP

## 2021-05-20 ENCOUNTER — HOSPITAL ENCOUNTER (OUTPATIENT)
Dept: PHYSICAL THERAPY | Facility: CLINIC | Age: 41
Setting detail: THERAPIES SERIES
End: 2021-05-20
Attending: ORTHOPAEDIC SURGERY
Payer: COMMERCIAL

## 2021-05-20 PROCEDURE — 97140 MANUAL THERAPY 1/> REGIONS: CPT | Mod: GP

## 2021-05-20 PROCEDURE — 97110 THERAPEUTIC EXERCISES: CPT | Mod: GP

## 2021-05-27 ENCOUNTER — HOSPITAL ENCOUNTER (OUTPATIENT)
Dept: PHYSICAL THERAPY | Facility: CLINIC | Age: 41
Setting detail: THERAPIES SERIES
End: 2021-05-27
Attending: ORTHOPAEDIC SURGERY
Payer: COMMERCIAL

## 2021-05-27 PROCEDURE — 97110 THERAPEUTIC EXERCISES: CPT | Mod: GP

## 2021-05-27 PROCEDURE — 97140 MANUAL THERAPY 1/> REGIONS: CPT | Mod: GP

## 2021-06-10 ENCOUNTER — HOSPITAL ENCOUNTER (OUTPATIENT)
Dept: PHYSICAL THERAPY | Facility: CLINIC | Age: 41
Setting detail: THERAPIES SERIES
End: 2021-06-10
Attending: ORTHOPAEDIC SURGERY
Payer: COMMERCIAL

## 2021-06-10 PROCEDURE — 97110 THERAPEUTIC EXERCISES: CPT | Mod: GP

## 2021-06-16 ENCOUNTER — HOSPITAL ENCOUNTER (OUTPATIENT)
Dept: PHYSICAL THERAPY | Facility: CLINIC | Age: 41
Setting detail: THERAPIES SERIES
End: 2021-06-16
Attending: ORTHOPAEDIC SURGERY
Payer: COMMERCIAL

## 2021-06-16 PROCEDURE — 97110 THERAPEUTIC EXERCISES: CPT | Mod: GP

## 2021-06-16 NOTE — PROGRESS NOTES
Outpatient Physical Therapy Progress Note     Patient: Eneida Armenta  : 1980    Beginning/End Dates of Reporting Period:  2021 to 2021    Referring Provider: Calvin Allen MD    Therapy Diagnosis: R knee pain, decreased ROM, decreased strength, impaired gait mechaincs, impaired proprioception, and decreased activity tolerance     Client Self Report: Pt reports that overall knee has been feeling pretty good lately. More sore today due to cleaning this morning w/o using her crutches. Hip is starting to get a little more sore as well.    Objective Measurements:  Objective Measure: pain  Details: 3/10 lateral edge of patella  Objective Measure: ROM  Details: ext AROM: 7 deg  PROM: 3 deg.  Flex 97 deg  Objective Measure: LEFS ()  Details:     Goals:  Goal Identifier HEP   Goal Description Pt will demonstrate accurate compliance to HEP >4 days per wk to independently progress function and manage symptoms   Target Date 10/03/21   Date Met      Progress:     Goal Identifier LEFS   Goal Description Pt will improve score on LEFS to >65/80 to show a clinically significant improvement in tolerance for daily activity(Progressing but has not met goal yet. )   Target Date 10/03/21   Date Met      Progress:     Goal Identifier Ambulation   Goal Description Pt will tolerate ambulating w/o assistive device for >30 min to be able to go grocery shopping comfortably(goal met with crutches, waiting for clearance to be off )   Target Date 21   Date Met      Progress:     Goal Identifier SLS   Goal Description Pt will tolerate SLS for >30 sec to show improved proprioception and strength on RLE to support the knee and prevent further injury(not initiated yet)   Target Date 21   Date Met      Progress:     Goal Identifier ROM   Goal Description Pt will demonstrate R knee ROM of at least 0-0-120 to allow for functional motion for sitting and ascending/descending the stairs(currently 0-7-97  today)   Target Date 08/05/21   Date Met      Progress:     Goal Identifier SLR   Goal Description Pt will demonstrate >20 reps of SLR w/o knee ext lag to show improved quad strength and coordination to be able to progress towards functional strengthening(slight ext lag of 10 deg present on all reps)   Target Date 05/07/21   Date Met      Progress:     Progress Toward Goals:   Progress this reporting period: Eneida participates well during physical therapy and reports good compliance to her HEP. Fairly low knee pain overall, can get more irritated with increased activity. R lateral hip pain is returning, more bothersome than knee pain. She is progressing well with strength and tolerating activity well. However, at rest she is lacking 7 deg of knee ext, lacking 3 with overpressure. She can only flex knee to max of 100 deg (observed at last visit). When performing supine SLR, she has increased knee ext lag of about 10-15 deg with every rep. ROM has been stable for the last few weeks but she demonstrates enough PROM that gains can be made. She is still ambulating w/bilateral axillary crutches, due to knee ext lag w/SLR, recommend continued use until improved quad strength/weightbearing tolerance. Eneida will benefit from continued skill PT intervention to further progress strength and mobility.     Plan:  Continue therapy per current plan of care.    Discharge:  No

## 2021-06-23 ENCOUNTER — HOSPITAL ENCOUNTER (OUTPATIENT)
Dept: PHYSICAL THERAPY | Facility: CLINIC | Age: 41
Setting detail: THERAPIES SERIES
End: 2021-06-23
Attending: ORTHOPAEDIC SURGERY
Payer: COMMERCIAL

## 2021-06-23 PROCEDURE — 97140 MANUAL THERAPY 1/> REGIONS: CPT | Mod: GP

## 2021-06-23 PROCEDURE — 97110 THERAPEUTIC EXERCISES: CPT | Mod: GP

## 2021-06-30 ENCOUNTER — HOSPITAL ENCOUNTER (OUTPATIENT)
Dept: PHYSICAL THERAPY | Facility: CLINIC | Age: 41
Setting detail: THERAPIES SERIES
End: 2021-06-30
Attending: ORTHOPAEDIC SURGERY
Payer: COMMERCIAL

## 2021-06-30 PROCEDURE — 97110 THERAPEUTIC EXERCISES: CPT | Mod: GP

## 2021-07-07 ENCOUNTER — HOSPITAL ENCOUNTER (OUTPATIENT)
Dept: PHYSICAL THERAPY | Facility: CLINIC | Age: 41
Setting detail: THERAPIES SERIES
End: 2021-07-07
Attending: ORTHOPAEDIC SURGERY
Payer: COMMERCIAL

## 2021-07-07 PROCEDURE — 97032 APPL MODALITY 1+ESTIM EA 15: CPT | Mod: GP

## 2021-07-07 PROCEDURE — 97110 THERAPEUTIC EXERCISES: CPT | Mod: GP

## 2021-07-07 NOTE — PROGRESS NOTES
Outpatient Physical Therapy Progress Note     Patient: Eneida Armenta  : 1980    Beginning/End Dates of Reporting Period:  2021 to 2021    Referring Provider: Calvin Allen MD    Therapy Diagnosis: R knee pain, decreased ROM, decreased strength, impaired gait mechaincs, impaired proprioception, and decreased activity tolerance     Client Self Report: Pt reports that knee is doing pretty good. Hip continues to worsen, more swelling and increased pain at the hip. Brought NMES machine with to trial today to work on improving quad function.     Objective Measurements:  Objective Measure: pain  Details: 0/10 at the knee, 4-5/10 at lateral hip.  Objective Measure: ROM  Details: ext AROM: 6 deg  PROM: 0 deg.  Flex 100 deg    Goals:  Goal Identifier HEP   Goal Description Pt will demonstrate accurate compliance to HEP >4 days per wk to independently progress function and manage symptoms(ongoing goal, demonstrates good compliance)   Target Date 10/03/21   Date Met      Progress:     Goal Identifier LEFS   Goal Description Pt will improve score on LEFS to >65/80 to show a clinically significant improvement in tolerance for daily activity(Still limited due to crutches/activity restrictions)   Target Date 10/03/21   Date Met      Progress:     Goal Identifier Ambulation   Goal Description Pt will tolerate ambulating w/o assistive device for >30 min to be able to go grocery shopping comfortably(still requiring crutches per surgeon and quad function)   Target Date 21   Date Met      Progress:     Goal Identifier SLS   Goal Description Pt will tolerate SLS for >30 sec to show improved proprioception and strength on RLE to support the knee and prevent further injury(not tested yet due to activity restrictions)   Target Date 21   Date Met      Progress:     Goal Identifier ROM   Goal Description Pt will demonstrate R knee ROM of at least 0-0-120 to allow for functional motion for sitting and  ascending/descending the stairs(R knee ROM of 0-6-100)   Target Date 08/05/21   Date Met      Progress:     Goal Identifier SLR   Goal Description Pt will demonstrate >20 reps of SLR w/o knee ext lag to show improved quad strength and coordination to be able to progress towards functional strengthening(demonstrates knee ext lag)   Target Date 08/05/21   Date Met      Progress:     Progress Toward Goals:   Progress this reporting period: Eneida continues to be consistent with her physical therapy. She is using her crutches due to reduced quad activation. She started working with electrical stimulation to help improve it. She reports very little knee pain but does have pain at her lateral R hip. Functional strength is improving and her knee ROM is slowly improving but demonstrates some progress, currently 0-6-100 deg. Pt will benefit from continued skilled PT to further progress strength and function.    Plan:  Continue therapy per current plan of care.    Discharge:  No

## 2021-07-07 NOTE — PROGRESS NOTES
Rehabilitation Services      OUTPATIENT PHYSICAL THERAPY  PLAN OF TREATMENT FOR OUTPATIENT REHABILITATION    Patient's Last Name, First Name, M.I.                YOB: 1980  Eneida Armenta                        Provider's Name  Gena Franco, PT Medical Record No.  [id number]                               Onset Date: 03/15/2021   Start of Care Date: 04/07/2021   Type:     _X_PT   ___OT   ___SLP Medical Diagnosis: s/p R knee open retinacular release w/plication repair of medial patellar ligament                       PT Diagnosis: R knee pain, decreased ROM, decreased strength, impaired gait mechaincs, impaired proprioception, and decreased activity tolerance      _________________________________________________________________________________  Plan of Treatment: balance training, gait training, manual therapy, neuromuscular re-education, ROM, strengthening, stretching, transfer training  as needed  Cryotherapy, Hot packs, Electrical stimulation, TENS, Ultrasound  as needed    Frequency/Duration: 1x per wk for 3 months     Goals:  Goal Identifier HEP   Goal Description Pt will demonstrate accurate compliance to HEP >4 days per wk to independently progress function and manage symptoms(ongoing goal, demonstrates good compliance)   Target Date 10/03/21   Date Met      Progress:     Goal Identifier LEFS   Goal Description Pt will improve score on LEFS to >65/80 to show a clinically significant improvement in tolerance for daily activity(Still limited due to crutches/activity restrictions)   Target Date 10/03/21   Date Met      Progress:     Goal Identifier Ambulation   Goal Description Pt will tolerate ambulating w/o assistive device for >30 min to be able to go grocery shopping comfortably(still requiring crutches per surgeon and quad function)   Target Date 08/05/21   Date Met      Progress:     Goal Identifier SLS   Goal  Description Pt will tolerate SLS for >30 sec to show improved proprioception and strength on RLE to support the knee and prevent further injury(not tested yet due to activity restrictions)   Target Date 08/05/21   Date Met      Progress:     Goal Identifier ROM   Goal Description Pt will demonstrate R knee ROM of at least 0-0-120 to allow for functional motion for sitting and ascending/descending the stairs(R knee ROM of 0-6-100)   Target Date 08/05/21   Date Met      Progress:     Goal Identifier SLR   Goal Description Pt will demonstrate >20 reps of SLR w/o knee ext lag to show improved quad strength and coordination to be able to progress towards functional strengthening(demonstrates knee ext lag)   Target Date 08/05/21   Date Met      Progress:     Progress Toward Goals:   Progress this reporting period: Eneida continues to be consistent with her physical therapy. She is using her crutches due to reduced quad activation. She started working with electrical stimulation to help improve it. She reports very little knee pain but does have pain at her lateral R hip. Functional strength is improving and her knee ROM is slowly improving but demonstrates some progress, currently 0-6-100 deg. Pt will benefit from continued skilled PT to further progress strength and function.      Certification date from 7/5/2021 to 10/2/2021.    Gena Franco, PT          I CERTIFY THE NEED FOR THESE SERVICES FURNISHED UNDER        THIS PLAN OF TREATMENT AND WHILE UNDER MY CARE .             Physician Signature               Date    X_____________________________________________________                      Referring Provider: Calvin Allen MD

## 2021-07-21 ENCOUNTER — HOSPITAL ENCOUNTER (OUTPATIENT)
Dept: PHYSICAL THERAPY | Facility: CLINIC | Age: 41
Setting detail: THERAPIES SERIES
End: 2021-07-21
Attending: ORTHOPAEDIC SURGERY
Payer: COMMERCIAL

## 2021-07-21 PROCEDURE — 97112 NEUROMUSCULAR REEDUCATION: CPT | Mod: GP,95

## 2021-07-21 PROCEDURE — 97110 THERAPEUTIC EXERCISES: CPT | Mod: GP,GT

## 2021-07-21 NOTE — PROGRESS NOTES
Eneida Armenta is a 40 year old female who is being seen via a billable video visit.      Patient has given verbal consent for Video visit? Yes    Video Start Time: 8:33    Telehealth Visit Details    Type of Service:  Telehealth    Video End Time (time video stopped): 9:00    Originating Location (pt. location): Home    Additional Participants in Telehealth Visit: n/a    Distant Location (provider location):  [facility]     Mode of Communication (Audio Visual or Audio Only):  audio and visual    Gena Franco, PT  July 21, 2021

## 2021-07-29 ENCOUNTER — HOSPITAL ENCOUNTER (OUTPATIENT)
Dept: PHYSICAL THERAPY | Facility: CLINIC | Age: 41
Setting detail: THERAPIES SERIES
End: 2021-07-29
Attending: ORTHOPAEDIC SURGERY
Payer: COMMERCIAL

## 2021-07-29 PROCEDURE — 97110 THERAPEUTIC EXERCISES: CPT | Mod: GP

## 2021-07-30 ENCOUNTER — HOSPITAL ENCOUNTER (EMERGENCY)
Facility: CLINIC | Age: 41
Discharge: HOME | End: 2021-07-30
Attending: PHYSICIAN ASSISTANT | Admitting: PHYSICIAN ASSISTANT
Payer: COMMERCIAL

## 2021-07-30 VITALS
SYSTOLIC BLOOD PRESSURE: 107 MMHG | BODY MASS INDEX: 36.08 KG/M2 | DIASTOLIC BLOOD PRESSURE: 68 MMHG | RESPIRATION RATE: 16 BRPM | HEIGHT: 69 IN | HEART RATE: 56 BPM | TEMPERATURE: 98.1 F | OXYGEN SATURATION: 99 % | WEIGHT: 243.6 LBS

## 2021-07-30 DIAGNOSIS — E16.2 HYPOGLYCEMIA: ICD-10-CM

## 2021-07-30 DIAGNOSIS — E03.9 HYPOTHYROIDISM: ICD-10-CM

## 2021-07-30 LAB
ALBUMIN SERPL-MCNC: 3.6 G/DL (ref 3.4–5)
ALBUMIN UR-MCNC: NEGATIVE MG/DL
ALP SERPL-CCNC: 81 U/L (ref 40–150)
ALT SERPL W P-5'-P-CCNC: 37 U/L (ref 0–50)
ANION GAP SERPL CALCULATED.3IONS-SCNC: 5 MMOL/L (ref 3–14)
APPEARANCE UR: ABNORMAL
AST SERPL W P-5'-P-CCNC: 21 U/L (ref 0–45)
BACTERIA #/AREA URNS HPF: ABNORMAL /HPF
BASOPHILS # BLD AUTO: 0.1 10E3/UL (ref 0–0.2)
BASOPHILS NFR BLD AUTO: 1 %
BILIRUB SERPL-MCNC: 0.6 MG/DL (ref 0.2–1.3)
BILIRUB UR QL STRIP: NEGATIVE
BUN SERPL-MCNC: 14 MG/DL (ref 7–30)
CALCIUM SERPL-MCNC: 8.7 MG/DL (ref 8.5–10.1)
CHLORIDE BLD-SCNC: 107 MMOL/L (ref 94–109)
CO2 SERPL-SCNC: 26 MMOL/L (ref 20–32)
COLOR UR AUTO: YELLOW
CREAT SERPL-MCNC: 0.88 MG/DL (ref 0.52–1.04)
EOSINOPHIL # BLD AUTO: 0.2 10E3/UL (ref 0–0.7)
EOSINOPHIL NFR BLD AUTO: 3 %
ERYTHROCYTE [DISTWIDTH] IN BLOOD BY AUTOMATED COUNT: 12.8 % (ref 10–15)
GFR SERPL CREATININE-BSD FRML MDRD: 83 ML/MIN/1.73M2
GLUCOSE BLD-MCNC: 76 MG/DL (ref 70–99)
GLUCOSE UR STRIP-MCNC: NEGATIVE MG/DL
HBA1C MFR BLD: 4.8 % (ref 0–5.6)
HCT VFR BLD AUTO: 39.1 % (ref 35–47)
HGB BLD-MCNC: 12.9 G/DL (ref 11.7–15.7)
HGB UR QL STRIP: NEGATIVE
IMM GRANULOCYTES # BLD: 0.1 10E3/UL
IMM GRANULOCYTES NFR BLD: 1 %
KETONES UR STRIP-MCNC: NEGATIVE MG/DL
LEUKOCYTE ESTERASE UR QL STRIP: ABNORMAL
LIPASE SERPL-CCNC: 155 U/L (ref 73–393)
LYMPHOCYTES # BLD AUTO: 1.9 10E3/UL (ref 0.8–5.3)
LYMPHOCYTES NFR BLD AUTO: 26 %
MCH RBC QN AUTO: 33 PG (ref 26.5–33)
MCHC RBC AUTO-ENTMCNC: 33 G/DL (ref 31.5–36.5)
MCV RBC AUTO: 100 FL (ref 78–100)
MONOCYTES # BLD AUTO: 0.6 10E3/UL (ref 0–1.3)
MONOCYTES NFR BLD AUTO: 8 %
MUCOUS THREADS #/AREA URNS LPF: PRESENT /LPF
NEUTROPHILS # BLD AUTO: 4.4 10E3/UL (ref 1.6–8.3)
NEUTROPHILS NFR BLD AUTO: 61 %
NITRATE UR QL: NEGATIVE
NRBC # BLD AUTO: 0 10E3/UL
NRBC BLD AUTO-RTO: 0 /100
PH UR STRIP: 6 [PH] (ref 5–7)
PLATELET # BLD AUTO: 199 10E3/UL (ref 150–450)
POTASSIUM BLD-SCNC: 4 MMOL/L (ref 3.4–5.3)
PROT SERPL-MCNC: 7 G/DL (ref 6.8–8.8)
RBC # BLD AUTO: 3.91 10E6/UL (ref 3.8–5.2)
RBC URINE: 1 /HPF
SODIUM SERPL-SCNC: 138 MMOL/L (ref 133–144)
SP GR UR STRIP: 1.03 (ref 1–1.03)
SQUAMOUS EPITHELIAL: 14 /HPF
T4 FREE SERPL-MCNC: 0.95 NG/DL (ref 0.76–1.46)
TSH SERPL DL<=0.005 MIU/L-ACNC: 7.32 MU/L (ref 0.4–4)
UROBILINOGEN UR STRIP-MCNC: NORMAL MG/DL
WBC # BLD AUTO: 7.2 10E3/UL (ref 4–11)
WBC URINE: 4 /HPF

## 2021-07-30 PROCEDURE — 36415 COLL VENOUS BLD VENIPUNCTURE: CPT | Performed by: PHYSICIAN ASSISTANT

## 2021-07-30 PROCEDURE — 81001 URINALYSIS AUTO W/SCOPE: CPT | Performed by: PHYSICIAN ASSISTANT

## 2021-07-30 PROCEDURE — 82040 ASSAY OF SERUM ALBUMIN: CPT | Performed by: PHYSICIAN ASSISTANT

## 2021-07-30 PROCEDURE — 85025 COMPLETE CBC W/AUTO DIFF WBC: CPT | Performed by: PHYSICIAN ASSISTANT

## 2021-07-30 PROCEDURE — 99283 EMERGENCY DEPT VISIT LOW MDM: CPT

## 2021-07-30 PROCEDURE — 84439 ASSAY OF FREE THYROXINE: CPT | Performed by: PHYSICIAN ASSISTANT

## 2021-07-30 PROCEDURE — 83690 ASSAY OF LIPASE: CPT | Performed by: PHYSICIAN ASSISTANT

## 2021-07-30 PROCEDURE — 83036 HEMOGLOBIN GLYCOSYLATED A1C: CPT | Performed by: PHYSICIAN ASSISTANT

## 2021-07-30 PROCEDURE — 84443 ASSAY THYROID STIM HORMONE: CPT | Performed by: PHYSICIAN ASSISTANT

## 2021-07-30 PROCEDURE — 99283 EMERGENCY DEPT VISIT LOW MDM: CPT | Performed by: PHYSICIAN ASSISTANT

## 2021-07-30 ASSESSMENT — MIFFLIN-ST. JEOR: SCORE: 1844.34

## 2021-07-30 NOTE — ED TRIAGE NOTES
Blood sugar is 73 per patient, she is lightheaded and dizzy, she states her blood sugars usually run 80-90.  Was diagnosed with hypoglycemia 6 years ago.

## 2021-07-30 NOTE — ED PROVIDER NOTES
History     Chief Complaint   Patient presents with     Hypoglycemia     The history is provided by the patient.     Eneida Fermin is a 40 year old female who presents to the emergency department for evaluation of recent low blood sugars. The patient reports having issues with her blood sugar for the past 6 years but has no history of diabetes. She regularly checks her blood sugar since she becomes symptomatic when it goes below 90. She is unsure what triggers her hypoglycemia. Her normal when feeling well is . Yesterday her blood sugar was 73 according to her at home glucose meter. Today she was on her way to a [facility] home to do home care and felt dizzy and developed a headache. She is still feeling dizzy but notes it has lessened. Earlier today she ate a chicken salad around 1200 then had a 12 oz can of mountain dew. When she re-checked her blood sugar it was 81. Her diet has not changed recently, no increased caffeine intake, no energy drinks. Denies fevers, chills, nausea, vomiting, cough, shortness of breath, changes to taste or smell, abdominal pain, diarrhea, urinary symptoms, rashes, flank pain, dental pain. Patient is on thyroid medications for a history of graves disease. No recent dose changes. She has had radioactive ablation on her thyroid. Levels were last checked about 2 months ago.     Allergies:  No Known Allergies    Problem List:    There are no problems to display for this patient.       Past Medical History:    No past medical history on file.    Past Surgical History:    No past surgical history on file.    Family History:    No family history on file.    Social History:  Marital Status:   [2]  Social History     Tobacco Use     Smoking status: Not on file   Substance Use Topics     Alcohol use: Not on file     Drug use: Not on file        Medications:    No current outpatient medications on file.        Review of Systems   All other systems reviewed and are  "negative.      Physical Exam   BP: 118/66  Pulse: 64  Temp: 98.1  F (36.7  C)  Resp: 16  Height: 175.3 cm (5' 9\")  Weight: 110.5 kg (243 lb 9.6 oz)  SpO2: 97 %      Physical Exam  Vitals and nursing note reviewed.   Constitutional:       General: She is not in acute distress.     Appearance: Normal appearance. She is not ill-appearing or diaphoretic.   HENT:      Head: Normocephalic and atraumatic.      Right Ear: Tympanic membrane, ear canal and external ear normal.      Left Ear: Tympanic membrane and external ear normal.      Nose: Nose normal. No congestion or rhinorrhea.      Mouth/Throat:      Mouth: Mucous membranes are dry.      Pharynx: No oropharyngeal exudate.      Comments: No sign of tooth abscess.  No gingival swelling.  Eyes:      General: No visual field deficit or scleral icterus.        Right eye: No discharge.         Left eye: No discharge.      Extraocular Movements: Extraocular movements intact.      Conjunctiva/sclera: Conjunctivae normal.      Pupils: Pupils are equal, round, and reactive to light.   Neck:      Thyroid: No thyromegaly.   Cardiovascular:      Rate and Rhythm: Normal rate and regular rhythm.      Heart sounds: Normal heart sounds. No murmur heard.     Pulmonary:      Effort: Pulmonary effort is normal. No respiratory distress.      Breath sounds: Normal breath sounds. No wheezing or rales.   Chest:      Chest wall: No tenderness.   Abdominal:      General: Bowel sounds are normal. There is no distension.      Palpations: Abdomen is soft. There is no mass.      Tenderness: There is no abdominal tenderness. There is no right CVA tenderness, left CVA tenderness, guarding or rebound.   Musculoskeletal:         General: No swelling, tenderness, deformity or signs of injury. Normal range of motion.      Cervical back: Normal range of motion and neck supple. No rigidity or tenderness.      Right lower leg: No edema.      Left lower leg: No edema.   Lymphadenopathy:      Cervical: No " cervical adenopathy.   Skin:     General: Skin is warm and dry.      Capillary Refill: Capillary refill takes less than 2 seconds.      Coloration: Skin is not jaundiced or pale.      Findings: No bruising, erythema, lesion or rash.   Neurological:      General: No focal deficit present.      Mental Status: She is alert and oriented to person, place, and time. Mental status is at baseline.      GCS: GCS eye subscore is 4. GCS verbal subscore is 5. GCS motor subscore is 6.      Cranial Nerves: Cranial nerves are intact. No cranial nerve deficit or dysarthria.      Sensory: Sensation is intact. No sensory deficit.      Motor: Motor function is intact. No weakness, tremor, abnormal muscle tone or pronator drift.      Coordination: Coordination is intact. Coordination normal. Finger-Nose-Finger Test and Heel to Shin Test normal. Rapid alternating movements normal.      Gait: Gait is intact.   Psychiatric:         Behavior: Behavior normal.         Thought Content: Thought content normal.         ED Course        Procedures      Results for orders placed or performed during the hospital encounter of 07/30/21 (from the past 24 hour(s))   CBC with platelets differential    Narrative    The following orders were created for panel order CBC with platelets differential.  Procedure                               Abnormality         Status                     ---------                               -----------         ------                     CBC with platelets and d...[[id number]]  Abnormal            Final result                 Please view results for these tests on the individual orders.   Comprehensive metabolic panel   Result Value Ref Range    Sodium 138 133 - 144 mmol/L    Potassium 4.0 3.4 - 5.3 mmol/L    Chloride 107 94 - 109 mmol/L    Carbon Dioxide (CO2) 26 20 - 32 mmol/L    Anion Gap 5 3 - 14 mmol/L    Urea Nitrogen 14 7 - 30 mg/dL    Creatinine 0.88 0.52 - 1.04 mg/dL    Calcium 8.7 8.5 - 10.1 mg/dL    Glucose 76  70 - 99 mg/dL    Alkaline Phosphatase 81 40 - 150 U/L    AST 21 0 - 45 U/L    ALT 37 0 - 50 U/L    Protein Total 7.0 6.8 - 8.8 g/dL    Albumin 3.6 3.4 - 5.0 g/dL    Bilirubin Total 0.6 0.2 - 1.3 mg/dL    GFR Estimate 83 >60 mL/min/1.73m2   TSH with free T4 reflex   Result Value Ref Range    TSH 7.32 (H) 0.40 - 4.00 mU/L   Lipase   Result Value Ref Range    Lipase 155 73 - 393 U/L   Hemoglobin A1c   Result Value Ref Range    Hemoglobin A1C 4.8 0.0 - 5.6 %   CBC with platelets and differential   Result Value Ref Range    WBC Count 7.2 4.0 - 11.0 10e3/uL    RBC Count 3.91 3.80 - 5.20 10e6/uL    Hemoglobin 12.9 11.7 - 15.7 g/dL    Hematocrit 39.1 35.0 - 47.0 %     78 - 100 fL    MCH 33.0 26.5 - 33.0 pg    MCHC 33.0 31.5 - 36.5 g/dL    RDW 12.8 10.0 - 15.0 %    Platelet Count 199 150 - 450 10e3/uL    % Neutrophils 61 %    % Lymphocytes 26 %    % Monocytes 8 %    % Eosinophils 3 %    % Basophils 1 %    % Immature Granulocytes 1 %    NRBCs per 100 WBC 0 <1 /100    Absolute Neutrophils 4.4 1.6 - 8.3 10e3/uL    Absolute Lymphocytes 1.9 0.8 - 5.3 10e3/uL    Absolute Monocytes 0.6 0.0 - 1.3 10e3/uL    Absolute Eosinophils 0.2 0.0 - 0.7 10e3/uL    Absolute Basophils 0.1 0.0 - 0.2 10e3/uL    Absolute Immature Granulocytes 0.1 (H) <=0.0 10e3/uL    Absolute NRBCs 0.0 10e3/uL   T4 free   Result Value Ref Range    Free T4 0.95 0.76 - 1.46 ng/dL   UA with Microscopic reflex to Culture    Specimen: Urine, Midstream   Result Value Ref Range    Color Urine Yellow Colorless, Straw, Light Yellow, Yellow    Appearance Urine Slightly Cloudy (A) Clear    Glucose Urine Negative Negative mg/dL    Bilirubin Urine Negative Negative    Ketones Urine Negative Negative mg/dL    Specific Gravity Urine 1.026 1.003 - 1.035    Blood Urine Negative Negative    pH Urine 6.0 5.0 - 7.0    Protein Albumin Urine Negative Negative mg/dL    Urobilinogen Urine Normal Normal, 2.0 mg/dL    Nitrite Urine Negative Negative    Leukocyte Esterase Urine Small  (A) Negative    Bacteria Urine Few (A) None Seen /HPF    Mucus Urine Present (A) None Seen /LPF    RBC Urine 1 <=2 /HPF    WBC Urine 4 <=5 /HPF    Squamous Epithelials Urine 14 (H) <=1 /HPF    Narrative    Urine Culture not indicated       Medications - No data to display    Assessments & Plan (with Medical Decision Making)     Hypoglycemia  Hypothyroidism     Eneida is a 40 year old female who presents to the ED for evaluation of recent low blood sugars. Associated dizziness and headache. Patient checks her sugars at home regularly using her glucose meter. Typically her sugar runs between 90 and 120 - below 90 she becomes symptomatic. History of hypoglycemia - no diagnosis of diabetes.  She reports that her blood sugar prior to arrival was 76.  She contacted her PCP regarding this and he recommended ED evaluation.  She denies any signs or symptoms of infection.  Asymptomatic when she is here in the ED.  She has not seen a dietitian before.  Reports being told that she was not diabetic.  On exam blood pressure 107/68, temperature 98.1, pulse 56, respirations 16, oxygen saturation 99% on room air. She does not appear distressed. No visual changes. Blood work showed a glucose level of 76.  I recommended that she consume some food or sugar containing liquid, but the patient declined.  She thought that she would be fine and, especially since she was feeling okay.  She reported that she would accept my offer if her symptoms were to change during the ED visit.  TSH level was re-checked since it has been a couple of months. This returned elevated at 7.32. T4 was within normal limits, but at the lower end of normal which could be a sign that she is becoming more hypothyroid with the recent dose decrease 2-3 months ago.  Blood work was otherwise insignificant. Urine was collected - urinalysis was clear of an infection.  Signs of contamination.  Discussed her work-up in detail with her.  Hypothyroidism with need for  levothyroxine medication change discussed with her.  Given the problems with not a simple dosing regimen at her current dose, I defer this to her PCP to decide how to change this.  Encouraged follow-up in the next few days.  Diet with clean proteins and complex carbohydrates reviewed with her.  Avoid fast sugars.  Avoid excessive caffeine amounts.  Ways to treat hypoglycemia when occurs reviewed with her.  She does state that she is seeing a dietitian in 3 days, which is an excellent start.  Indications for ED return reviewed with her.  Patient was in agreement this plan was suitable for discharge.     I have reviewed the nursing notes.    I have reviewed the findings, diagnosis, plan and need for follow up with the patient.       There are no discharge medications for this patient.      Final diagnoses:   Hypoglycemia   Hypothyroidism       This document serves as a record of services personally performed by Maged Thomas PA-C*. It was created on their behalf by Jane Dickerson, a trained medical scribe. The creation of this record is based on the provider's personal observations and the statements of the patient. This document has been checked and approved by the attending provider.    Note: Chart documentation done in part with Dragon Voice Recognition software. Although reviewed after completion, some word and grammatical errors may remain.    7/30/2021   [facility] EMERGENCY DEPT     Maged Thomas PA-C  07/30/21 1928

## 2021-07-30 NOTE — DISCHARGE INSTRUCTIONS
"It was a pleasure working with you today!  I hope your condition improves rapidly!     As we discussed, I am concerned that your thyroid needs to be increased again.  I will leave this up to your primary care provider to make a decision on what dose they would like to put you on.  I generally recheck the thyroid level 6 weeks after a change to see how things balanced out.    Please focus your diet on a \"complex carbohydrates \"and good protein sources like we discussed.  I am glad that you are set up with a dietitian here next week, as the information that you learned during that appointment will be extremely valuable.  I would recommend small more frequent meals to stabilize your blood sugar.  You could consider a nutritional supplement product for days when you are in a pinch and cannot eat in a timely manner or with good balance foods.  This product is called UCAN.  You can find this online.  "

## 2021-08-05 ENCOUNTER — HOSPITAL ENCOUNTER (OUTPATIENT)
Dept: PHYSICAL THERAPY | Facility: CLINIC | Age: 41
Setting detail: THERAPIES SERIES
End: 2021-08-05
Attending: ORTHOPAEDIC SURGERY
Payer: COMMERCIAL

## 2021-08-05 PROCEDURE — 97110 THERAPEUTIC EXERCISES: CPT | Mod: GP

## 2021-08-05 PROCEDURE — 97035 APP MDLTY 1+ULTRASOUND EA 15: CPT | Mod: GP

## 2021-08-20 ENCOUNTER — HOSPITAL ENCOUNTER (OUTPATIENT)
Dept: PHYSICAL THERAPY | Facility: CLINIC | Age: 41
Setting detail: THERAPIES SERIES
End: 2021-08-20
Attending: ORTHOPAEDIC SURGERY
Payer: COMMERCIAL

## 2021-08-20 PROCEDURE — 97110 THERAPEUTIC EXERCISES: CPT | Mod: GP

## 2021-08-25 ENCOUNTER — HOSPITAL ENCOUNTER (OUTPATIENT)
Dept: PHYSICAL THERAPY | Facility: CLINIC | Age: 41
Setting detail: THERAPIES SERIES
End: 2021-08-25
Attending: ORTHOPAEDIC SURGERY
Payer: COMMERCIAL

## 2021-08-25 PROCEDURE — 97530 THERAPEUTIC ACTIVITIES: CPT | Mod: GP

## 2021-08-25 PROCEDURE — 97110 THERAPEUTIC EXERCISES: CPT | Mod: GP

## 2021-08-25 NOTE — PROGRESS NOTES
Outpatient Physical Therapy Progress Note     Patient: Eneida Fermin  : 1980    Beginning/End Dates of Reporting Period:  2021 to 2021    Referring Provider: Calvin Allen MD    Therapy Diagnosis: R knee pain, decreased ROM, decreased strength, impaired gait mechaincs, impaired proprioception, and decreased activity tolerance     Client Self Report: Pt reports that she had a fall on Saturday. She had a drop in her blood sugar and fell in the hallway    Objective Measurements:  Objective Measure: pain  Details: 6/10 at knee, really no pain at the hip today  Objective Measure: ROM  Details: ext AROM:  lacking 7 deg  PROM: 0 deg.  Flex 92 deg - impacted due to increase in swelling today  Objective Measure: LEFS ()  Details: (P)  - decreased due to recent fall    Goals:  Goal Identifier HEP   Goal Description Pt will demonstrate accurate compliance to HEP >4 days per wk to independently progress function and manage symptoms   Target Date 10/03/21   Date Met      Progress (detail required for progress note): reports good compliance, ongoing goal     Goal Identifier LEFS   Goal Description Pt will improve score on LEFS to >65/80 to show a clinically significant improvement in tolerance for daily activity   Target Date 10/03/21   Date Met      Progress (detail required for progress note): decreased at this time due to recent fall     Goal Identifier Ambulation   Goal Description Pt will tolerate ambulating w/o assistive device for >30 min to be able to go grocery shopping comfortably   Target Date 21   Date Met      Progress (detail required for progress note): still using 1 crutch due to decreased quad strength but improving     Goal Identifier SLS   Goal Description Pt will tolerate SLS for >30 sec to show improved proprioception and strength on RLE to support the knee and prevent further injury   Target Date 21   Date Met      Progress (detail required for progress note):  not initiated yet due to quad strength and restrictions     Goal Identifier ROM   Goal Description Pt will demonstrate R knee ROM of at least 0-0-120 to allow for functional motion for sitting and ascending/descending the stairs   Target Date (P) 10/24/21   Date Met      Progress (detail required for progress note): was progressing to 0-6-100 prior to recent fall     Goal Identifier SLR   Goal Description Pt will demonstrate >20 reps of SLR w/o knee ext lag to show improved quad strength and coordination to be able to progress towards functional strengthening   Target Date (P) 10/24/21   Date Met      Progress (detail required for progress note): progressing, minimal ext lag after about 10 reps compared to starting position     Progress: Eneida was making good progress, slowly improving with quad strength and ROM. She had a fall last Saturday due to low blood sugars which has caused increased pain in posterior knee as well as lateral edge of patella. Pt reports that it feels slightly less stable. Due to being more painful and the knee being slightly more swollen, her ROM and function were decreased today. Prior to the fall, pt was tolerating SLR with improving ext lag and quad function continued to progress. Pt will benefit from continued skilled PT to further progress strength and function.     Plan:  Continue therapy per current plan of care.    Discharge:  No

## 2021-09-01 ENCOUNTER — HOSPITAL ENCOUNTER (OUTPATIENT)
Dept: PHYSICAL THERAPY | Facility: CLINIC | Age: 41
Setting detail: THERAPIES SERIES
End: 2021-09-01
Attending: ORTHOPAEDIC SURGERY
Payer: COMMERCIAL

## 2021-09-01 PROCEDURE — 97110 THERAPEUTIC EXERCISES: CPT | Mod: GP

## 2021-09-01 PROCEDURE — 97140 MANUAL THERAPY 1/> REGIONS: CPT | Mod: GP

## 2021-09-07 NOTE — PROGRESS NOTES
Outpatient Physical Therapy Discharge Note     Patient: Eneida Fermin  : 1980    Beginning/End Dates of Reporting Period:  2020 to 10/19/2020    Referring Provider: Dr. Calvin Allen MD    Therapy Diagnosis: R LE pain, decreased R LE strength, increased muscle tightness, gait deviations, R knee swelling      Client Self Report: She reports an increase in her knee symptoms with cleaning over the weekend. She pushed through.     Objective Measurements:        Objective Measure: Pain  Details: 7-8/10  Objective Measure: R knee ROM       Goals:  Goal Identifier 1   Goal Description Patient will increase LEFS score from 32/80 (40%) to at least 72/80 in order to demonstrate at least 90% maximal function in order to facilitate return to prior level of function.     Target Date 20   Date Met      Progress (detail required for progress note):       Goal Identifier 2   Goal Description Patient will report R LE pain at < 2/10 with all activities in order to facilitate return to PLOF.   Target Date 20   Date Met      Progress (detail required for progress note):       Goal Identifier 3   Goal Description Patient will increase R knee extension AROM from 5 deg to 0 deg and will improve R knee flexion AROM from 90 deg to 110 deg (in supine) in order to facilitate return to PLOF.   Target Date 20   Date Met      Progress (detail required for progress note):               Plan:  Discharge from therapy.    Discharge:    Reason for Discharge: Change in medical status.  Patient underwent TKA in 2020 and re-started PT with another therapist.      Equipment Issued: N/A    Discharge Plan: Patient to continue home program.        Karma Grant PT, DPT, CLT-ANÍBAL  [facility]  Physical Therapist     Phone: [phone]  Email: [email or web address]

## 2021-11-11 ENCOUNTER — TRANSFERRED RECORDS (OUTPATIENT)
Dept: HEALTH INFORMATION MANAGEMENT | Facility: CLINIC | Age: 41
End: 2021-11-11
Payer: COMMERCIAL

## 2022-01-03 ENCOUNTER — TRANSFERRED RECORDS (OUTPATIENT)
Dept: HEALTH INFORMATION MANAGEMENT | Facility: CLINIC | Age: 42
End: 2022-01-03
Payer: COMMERCIAL

## 2022-01-18 ENCOUNTER — MEDICAL CORRESPONDENCE (OUTPATIENT)
Dept: HEALTH INFORMATION MANAGEMENT | Facility: CLINIC | Age: 42
End: 2022-01-18
Payer: COMMERCIAL

## 2022-01-24 ENCOUNTER — HOSPITAL ENCOUNTER (OUTPATIENT)
Dept: PHYSICAL THERAPY | Facility: CLINIC | Age: 42
Setting detail: THERAPIES SERIES
End: 2022-01-24
Attending: ORTHOPAEDIC SURGERY
Payer: COMMERCIAL

## 2022-01-24 PROCEDURE — 97112 NEUROMUSCULAR REEDUCATION: CPT | Mod: GP | Performed by: PHYSICAL THERAPIST

## 2022-01-24 PROCEDURE — 97162 PT EVAL MOD COMPLEX 30 MIN: CPT | Mod: GP | Performed by: PHYSICAL THERAPIST

## 2022-01-25 NOTE — PROGRESS NOTES
[facility]    OUTPATIENT PHYSICAL THERAPY ORTHOPEDIC EVALUATION  PLAN OF TREATMENT FOR OUTPATIENT REHABILITATION  (COMPLETE FOR INITIAL CLAIMS ONLY)  Patient's Last Name, First Name, M.I.  YOB: 1980  Eneida Fermin    Provider s Name:  [facility]   Medical Record No.  [id number]   Start of Care Date:  01/24/22   Onset Date:  01/03/22   Type:     _X__PT   ___OT   ___SLP Medical Diagnosis:  Right hip, greater trochanteric bursitis; SP right knee ITB release on 1-3-2022     PT Diagnosis:  R hip pain with positive SIJ tests   Visits from SOC:  1      _________________________________________________________________________________  Plan of Treatment/Functional Goals:  gait training,manual therapy,neuromuscular re-education,strengthening,transfer training,ROM,stretching        as needed  Goals  Goal Identifier: Calming symptoms  Goal Description: Eneida will be able to use positioning, pain education, and activities to calm her symptoms to 15/10 allowing her to increase her activity level  Target Date: 03/18/22    Goal Identifier: Walking  Goal Description: Eneida will be able to walk 1/4 mile with 1-2/10 increase in symptoms   Target Date: 02/24/22    Goal Identifier: Job  Goal Description: Eneida will be able to stand, walk and complete other job tasks and improve her LEFS score to 20/80 indicating improved function  Target Date: 03/25/22          Therapy Frequency:     Predicted Duration of Therapy Intervention:  1-2 times per week x 6 weeks    Constanza Gonzales, PT                 I CERTIFY THE NEED FOR THESE SERVICES FURNISHED UNDER        THIS PLAN OF TREATMENT AND WHILE UNDER MY CARE .             Physician Signature               Date    X_____________________________________________________                             Certification Date From:  01/25/22    Certification Date To:  03/08/22    Referring Provider:  Calvin Allen MD    Initial Assessment        See Epic Evaluation Start of Care Date: 01/24/22
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
## 2022-01-26 ENCOUNTER — HOSPITAL ENCOUNTER (OUTPATIENT)
Dept: PHYSICAL THERAPY | Facility: CLINIC | Age: 42
Setting detail: THERAPIES SERIES
End: 2022-01-26
Attending: ORTHOPAEDIC SURGERY
Payer: COMMERCIAL

## 2022-01-26 PROCEDURE — 97112 NEUROMUSCULAR REEDUCATION: CPT | Mod: GP | Performed by: PHYSICAL THERAPIST

## 2022-01-26 PROCEDURE — 97140 MANUAL THERAPY 1/> REGIONS: CPT | Mod: GP | Performed by: PHYSICAL THERAPIST

## 2022-02-01 ENCOUNTER — HOSPITAL ENCOUNTER (OUTPATIENT)
Dept: PHYSICAL THERAPY | Facility: CLINIC | Age: 42
Setting detail: THERAPIES SERIES
End: 2022-02-01
Attending: ORTHOPAEDIC SURGERY
Payer: COMMERCIAL

## 2022-02-01 PROCEDURE — 97140 MANUAL THERAPY 1/> REGIONS: CPT | Mod: GP | Performed by: PHYSICAL THERAPIST

## 2022-02-01 PROCEDURE — 97112 NEUROMUSCULAR REEDUCATION: CPT | Mod: GP | Performed by: PHYSICAL THERAPIST

## 2022-02-08 ENCOUNTER — HOSPITAL ENCOUNTER (OUTPATIENT)
Dept: PHYSICAL THERAPY | Facility: CLINIC | Age: 42
Setting detail: THERAPIES SERIES
End: 2022-02-08
Attending: ORTHOPAEDIC SURGERY
Payer: COMMERCIAL

## 2022-02-08 PROCEDURE — 97140 MANUAL THERAPY 1/> REGIONS: CPT | Mod: GP | Performed by: PHYSICAL THERAPIST

## 2022-02-08 PROCEDURE — 97110 THERAPEUTIC EXERCISES: CPT | Mod: GP | Performed by: PHYSICAL THERAPIST

## 2022-03-01 ENCOUNTER — TRANSFERRED RECORDS (OUTPATIENT)
Dept: HEALTH INFORMATION MANAGEMENT | Facility: CLINIC | Age: 42
End: 2022-03-01
Payer: COMMERCIAL

## 2022-03-16 ENCOUNTER — OFFICE VISIT (OUTPATIENT)
Dept: FAMILY MEDICINE | Facility: CLINIC | Age: 42
End: 2022-03-16
Payer: COMMERCIAL

## 2022-03-16 VITALS
HEART RATE: 99 BPM | SYSTOLIC BLOOD PRESSURE: 104 MMHG | WEIGHT: 262.4 LBS | DIASTOLIC BLOOD PRESSURE: 64 MMHG | BODY MASS INDEX: 38.75 KG/M2 | RESPIRATION RATE: 10 BRPM | OXYGEN SATURATION: 97 % | TEMPERATURE: 98.3 F

## 2022-03-16 DIAGNOSIS — M53.3 SI (SACROILIAC) JOINT DYSFUNCTION: ICD-10-CM

## 2022-03-16 DIAGNOSIS — M70.61 GREATER TROCHANTERIC BURSITIS OF RIGHT HIP: ICD-10-CM

## 2022-03-16 DIAGNOSIS — M76.31 IT BAND SYNDROME, RIGHT: Primary | ICD-10-CM

## 2022-03-16 PROCEDURE — 99203 OFFICE O/P NEW LOW 30 MIN: CPT | Performed by: STUDENT IN AN ORGANIZED HEALTH CARE EDUCATION/TRAINING PROGRAM

## 2022-03-16 RX ORDER — PROCHLORPERAZINE 25 MG/1
SUPPOSITORY RECTAL
COMMUNITY
Start: 2021-10-27

## 2022-03-16 RX ORDER — IBUPROFEN 800 MG/1
800 TABLET, FILM COATED ORAL
COMMUNITY

## 2022-03-16 RX ORDER — LEVOTHYROXINE SODIUM 150 UG/1
150 TABLET ORAL
COMMUNITY
Start: 2021-11-01

## 2022-03-16 RX ORDER — FLUTICASONE PROPIONATE 220 UG/1
AEROSOL, METERED RESPIRATORY (INHALATION)
COMMUNITY
Start: 2021-07-17

## 2022-03-16 RX ORDER — NORTRIPTYLINE HCL 25 MG
25 CAPSULE ORAL AT BEDTIME
COMMUNITY

## 2022-03-16 RX ORDER — ALBUTEROL SULFATE 90 UG/1
AEROSOL, METERED RESPIRATORY (INHALATION)
COMMUNITY
Start: 2022-03-07

## 2022-03-16 RX ORDER — ALBUTEROL SULFATE 0.83 MG/ML
SOLUTION RESPIRATORY (INHALATION)
COMMUNITY
Start: 2021-12-13

## 2022-03-16 ASSESSMENT — ENCOUNTER SYMPTOMS: BACK PAIN: 1

## 2022-03-16 ASSESSMENT — PAIN SCALES - GENERAL: PAINLEVEL: WORST PAIN (10)

## 2022-03-16 NOTE — PROGRESS NOTES
"  Assessment & Plan     It band syndrome, right    Greater trochanteric bursitis of right hip    SI (sacroiliac) joint dysfunction    It does sound like patient has had no improvement with physical therapy but unsure of previous procedures or what plan was by [facility] orthopedics.  Release of information obtained today to see outside records.  Will likely need referral to orthopedics for second opinion given her complex history.  Does sound like previous imaging was not concerning for her back to be contributing to her symptoms.  I do wonder if her SI joint is at least partially contributing.  I would likely recommend physical therapy for her SI joint as patient is likely compensating given her IT band but I do think she will ultimately need orthopedic referral.       BMI:   Estimated body mass index is 38.75 kg/m  as calculated from the following:    Height as of 7/30/21: 1.753 m (5' 9\").    Weight as of this encounter: 119 kg (262 lb 6.4 oz).     Follow-up with previous orthopedic records.  Will consider referral to orthopedics.    Roverto Barrera MD  [facility]    Julianne Monique is a 41 year old who presents for the following health issues     Back Pain     History of Present Illness       Back Pain:  She presents for follow up of back pain. Patient's back pain is a recurring problem.  Location of back pain:  Right lower back and right hip  Description of back pain: sharp and shooting  Back pain spreads: right thigh    Since patient first noticed back pain, pain is: unchanged  Does back pain interfere with her job:  Yes      Reason for visit:  Pain  Symptoms include:  Serve lower right pain and goes into the hip. Hip is also swollen  Symptom intensity:  Severe  Symptom progression:  Staying the same  Had these symptoms before:  Yes  Has tried/received treatment for these symptoms:  Yes  Previous treatment was successful:  No  What makes it worse:  Walking and standing  What " makes it better:  No    She eats 4 or more servings of fruits and vegetables daily.She consumes 1 sweetened beverage(s) daily.She exercises with enough effort to increase her heart rate 10 to 19 minutes per day.  She exercises with enough effort to increase her heart rate 3 or less days per week.   She is taking medications regularly.     Patient previously following with [facility] orthopedics and unable to view any records today.  Reports previous history of right knee replacement after trouble with patellar dislocations.  Tells me she did have trouble with alignment after that needed correction procedure and physical therapy.  Now for the last year she has had trouble with right hip and leg pain as well as low back pain on that side.  Gait has been affected.  She was seen by orthopedics who recommended IT band release due to IT band syndrome.  Also had greater trochanteric injections done with no benefit.  After surgery she did see physical therapy and has not had any improvement.  She does tell me that previous surgeon had her follow-up with a different orthopedic provider who wanted her to consider further surgical intervention.  No follow-up planned at this time with them.  She is wanting another opinion.  She does use ibuprofen for discomfort with some improvement.  Working on doing stretches and exercises at home that has helped somewhat but symptoms persistent.  No other recent injury or change for her.    Review of Systems   Musculoskeletal: Positive for back pain.      Constitutional, HEENT, cardiovascular, pulmonary, gi and gu systems are negative, except as otherwise noted.      Objective    /64   Pulse 99   Temp 98.3  F (36.8  C)   Resp 10   Wt 119 kg (262 lb 6.4 oz)   SpO2 97%   BMI 38.75 kg/m    Body mass index is 38.75 kg/m .  Physical Exam   GENERAL: healthy, alert and no distress  EYES: Eyes grossly normal to inspection, PERRL and conjunctivae and sclerae normal  HENT: hearing grossly  intact  RESP: Breathing comfortably on room air  CV: No extremity edema  MS: Right SI joint tenderness to palpation.  Does have right greater trochanteric tenderness as well as tenderness down her IT band to her knee.  Negative straight leg test.  Does have limited flexion at the knee since her replacement.  Gait abnormal with pelvic tilt favoring her right side.  SKIN: no suspicious lesions or rashes  NEURO: Normal strength and tone, mentation intact and speech normal  PSYCH: mentation appears normal, affect normal/bright

## 2022-03-25 ENCOUNTER — MYC MEDICAL ADVICE (OUTPATIENT)
Dept: FAMILY MEDICINE | Facility: CLINIC | Age: 42
End: 2022-03-25
Payer: COMMERCIAL

## 2022-03-25 DIAGNOSIS — M76.31 IT BAND SYNDROME, RIGHT: Primary | ICD-10-CM

## 2022-03-25 DIAGNOSIS — M53.3 SI (SACROILIAC) JOINT DYSFUNCTION: ICD-10-CM

## 2022-04-04 NOTE — DISCHARGE SUMMARY
[facility]    Outpatient Physical Therapy Discharge Note  Patient: Eneida Fermin  : 1980    Beginning/End Dates of Reporting Period:  3 sessions between 2022 and 2022    Referring Provider: Calvin Allen MD    Therapy Diagnosis: R hip pain with positive SIJ tests     Client Self Report: At the time of her last session, she reported:   Here with son Say. She is noting 6/10 in am and increases as day progresses reporting a max of 10/10 now vs 12+/10. Still sleeping on reclining couch. She tried arranging pillows as we discussed last session without benefit.     Objective Measurements:  At the time of her last session:  Objective Measure: Surgery   Details: 1-3-2022 without precautions  Objective Measure: Symptoms  Details: proximal incision lateral R knee, swelling about the R hip reported and distal ITB        Goals:  Goal Identifier Calming symptoms   Goal Description Eneida will be able to use positioning, pain education, and activities to calm her symptoms to 15/10 allowing her to increase her activity level (Symptoms max of 10/10 over last 1-2 wks with 6/10 am)   Target Date 22   Date Met      Progress (detail required for progress note):       Goal Identifier Walking   Goal Description Eneida will be able to walk 1/4 mile with 1-2/10 increase in symptoms  (does not feel this has changed)   Target Date 22   Date Met      Progress (detail required for progress note):       Goal Identifier Job   Goal Description Eneida will be able to stand, walk and complete other job tasks and improve her LEFS score to 20/80 indicating improved function   Target Date 22   Date Met      Progress (detail required for progress note):       Goals not recently assessed. Did not tolerate exercises/activities due to pain.       Plan:  Discharge from therapy.    Discharge:    Reason for Discharge:  She was not improving and will be having another surgery.     Equipment Issued: none     Discharge Plan: Other services: second surgery.

## 2022-04-15 NOTE — PROGRESS NOTES
[facility]    Outpatient Physical Therapy Discharge Note  Patient: Eneida Fermin  : 1980    Beginning/End Dates of Reporting Period:  21 to 21    Referring Provider: Calvin Allen MD    Therapy Diagnosis: R knee pain, decreased ROM, decreased strength, impaired gait mechaincs, impaired proprioception, and decreased activity tolerance     Client Self Report: Pt reports that she had an injection in the knee that didn't seem to make a difference. Pain has gotten a little better but still feels swollen, pulling behind the knee, still having pain along lateral edge of patella and coming around the bottom edge.    Objective Measurements:  Objective Measure: pain  Details: 5/10    Goals:  Goal Identifier HEP   Goal Description Pt will demonstrate accurate compliance to HEP >4 days per wk to independently progress function and manage symptoms   Target Date 10/03/21   Date Met      Progress (detail required for progress note): reports good compliance - ongoing goal     Goal Identifier LEFS   Goal Description Pt will improve score on LEFS to >65/80 to show a clinically significant improvement in tolerance for daily activity   Target Date 10/03/21   Date Met      Progress (detail required for progress note):       Goal Identifier Ambulation   Goal Description Pt will tolerate ambulating w/o assistive device for >30 min to be able to go grocery shopping comfortably   Target Date 21   Date Met      Progress (detail required for progress note): progressing well with quad function. Recent fall decreased tolerance for weight bearing     Goal Identifier SLS   Goal Description Pt will tolerate SLS for >30 sec to show improved proprioception and strength on RLE to support the knee and prevent further injury   Target Date 21   Date Met      Progress (detail required for progress note): has not  initiated yet due to quad function and restrictions     Goal Identifier ROM   Goal Description Pt will demonstrate R knee ROM of at least 0-0-120 to allow for functional motion for sitting and ascending/descending the stairs   Target Date 10/24/21   Date Met      Progress (detail required for progress note): decreased motion due to swelling today, was at 0-6-100 prior to fall     Goal Identifier SLR   Goal Description Pt will demonstrate >20 reps of SLR w/o knee ext lag to show improved quad strength and coordination to be able to progress towards functional strengthening   Target Date 10/24/21   Date Met      Progress (detail required for progress note): Ext lag present compared to starting position after about 10 reps       Plan:  Discharge from therapy.    Discharge:    Reason for Discharge: No further expectation of progress.    Equipment Issued: HEP    Discharge Plan: Patient to continue home program.

## 2022-04-25 ENCOUNTER — OFFICE VISIT (OUTPATIENT)
Dept: NEUROSURGERY | Facility: CLINIC | Age: 42
End: 2022-04-25
Payer: COMMERCIAL

## 2022-04-25 DIAGNOSIS — M54.16 LUMBAR RADICULOPATHY: Primary | ICD-10-CM

## 2022-04-25 PROCEDURE — 99203 OFFICE O/P NEW LOW 30 MIN: CPT | Performed by: PHYSICIAN ASSISTANT

## 2022-04-25 NOTE — LETTER
4/25/2022         RE: Eneida Fermin  [address] [address]        Dear Colleague,    Thank you for referring your patient, Eneida Fermin, to the [facility] NEUROSURGERY CLINIC [place]. Please see a copy of my visit note below.    Dr. Robert Javier  [facility]  Neurosurgery Clinic Visit      CC: back and right leg pain    Primary care Provider: No Ref-Primary, Physician    Referring Provider:  Roverto Barrera MD      Reason For Visit:   I was asked to consult on the patient for back and right leg pain..      HPI: Eneida Fermin is a 41 year old female who presents for evaluation of her chief complaint of low back and right leg pain.  She has had symptoms for the last couple of years, with no particular event or injury.  She describes pain in the low back, that radiates down the lateral aspect of her right thigh to the knee.  She has worked extensively with physical therapy while a patient at 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  She states that the pain in her right thigh has not gotten any better.  She also ended up having a knee replacement and multiple surgeries on her right knee even following that.  She feels like her right leg wants to buckle at times.  She has not had any epidural injections.  No bowel or bladder issues, or any problems with balance or coordination, with the exception of her right leg occasionally feeling like it is going to give out.      Past Medical History reviewed with patient during visit.    Past Surgical History reviewed with patient during visit.    Current Outpatient Medications   Medication     albuterol (PROVENTIL) (2.5 MG/3ML) 0.083% neb solution     blood glucose (CONTOUR NEXT TEST) test strip     Continuous Blood Gluc  (DEXCOM G6 ) ALBERT     fluticasone (FLOVENT HFA) 220 MCG/ACT inhaler     ibuprofen (ADVIL/MOTRIN) 800 MG tablet     levothyroxine  (SYNTHROID/LEVOTHROID) 150 MCG tablet     nortriptyline (PAMELOR) 25 MG capsule     VENTOLIN  (90 Base) MCG/ACT inhaler     No current facility-administered medications for this visit.       No Known Allergies    Social History     Socioeconomic History     Marital status:    Tobacco Use     Smoking status: Never Smoker     Smokeless tobacco: Never Used       No family history on file.       ROS: 10 point ROS neg other than the symptoms noted above in the HPI.    Vital Signs: There were no vitals taken for this visit.    Examination:  Constitutional:  Alert, well nourished, NAD.  HEENT: Normocephalic, atraumatic.   Pulmonary:  Without shortness of breath, normal effort.   Lymph: no lymphadenopathy to low back or LE.   Integumentary: Skin is free of rashes or lesions.   Cardiovascular:  No pitting edema of BLE.    Psych: Normal affect, no apparent distress    Neurological:  Awake  Alert  Oriented x 3  Speech clear  Cranial nerves II - XII grossly intact  Motor exam   Hip Flexor:                Right: 5/5  Left:  5/5  Hip Adductor:             Right:  5/5  Left:  5/5  Hip Abductor:             Right:  5/5  Left:  5/5  Gastroc Soleus:        Right:  5/5  Left:  5/5  Tib/Ant:                      Right:  5/5  Left:  5/5  EHL:                          Right:  5/5  Left:  5/5       Sensation normal to bilateral upper and lower extremities.    Reflexes are 2+ in the patellar and Achilles. There is no clonus. Downgoing Babinski.    Musculoskeletal:  Gait: Able to stand from a seated position. Normal non-antalgic, non-myelopathic gait.  Able to heel/toe walk without loss of balance      Imaging:       Assessment/Plan:     Right low back and right leg pain    Eneida Fermin is a 41 year old female.  I did have a discussion the patient regarding her symptoms.  She has been treated extensively both conservatively and surgically with [facility] orthopedics for her IT band syndrome.  Unfortunately, she has not  noticed any symptomatic improvement in her right leg pain.  She has also had MRI of her hip, and this does not show any acute pathology.  At this point, I did recommend obtaining a lumbar spine MRI for further evaluation.  This could be due to some nerve compression.  We will follow-up with her by phone once the MRI is completed.  She did wish to proceed with that option and voiced agreement and understanding.          Prabhjot Bah PA-C  [facility] Neurosurgery  [address] [address]    Tel [phone]  Pager [phone]      The use of Dragon/LightSquared dictation services may have been used to construct the content in this note; any grammatical or spelling errors are non-intentional. Please contact the author of this note directly if you are in need of any clarification.        Again, thank you for allowing me to participate in the care of your patient.        Sincerely,        Prabhjot Bah PA-C

## 2022-04-25 NOTE — PROGRESS NOTES
Dr. Robert Javier  [facility]  Neurosurgery Clinic Visit      CC: back and right leg pain    Primary care Provider: No Ref-Primary, Physician    Referring Provider:  Roverto Barrera MD      Reason For Visit:   I was asked to consult on the patient for back and right leg pain..      HPI: Eneida Fermin is a 41 year old female who presents for evaluation of her chief complaint of low back and right leg pain.  She has had symptoms for the last couple of years, with no particular event or injury.  She describes pain in the low back, that radiates down the lateral aspect of her right thigh to the knee.  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  She states that the pain in her right thigh has not gotten any better.  She also ended up having a knee replacement and multiple surgeries on her right knee even following that.  She feels like her right leg wants to buckle at times.  She has not had any epidural injections.  No bowel or bladder issues, or any problems with balance or coordination, with the exception of her right leg occasionally feeling like it is going to give out.      Past Medical History reviewed with patient during visit.    Past Surgical History reviewed with patient during visit.    Current Outpatient Medications   Medication     albuterol (PROVENTIL) (2.5 MG/3ML) 0.083% neb solution     blood glucose (CONTOUR NEXT TEST) test strip     Continuous Blood Gluc  (DEXCOM G6 ) ALBERT     fluticasone (FLOVENT HFA) 220 MCG/ACT inhaler     ibuprofen (ADVIL/MOTRIN) 800 MG tablet     levothyroxine (SYNTHROID/LEVOTHROID) 150 MCG tablet     nortriptyline (PAMELOR) 25 MG capsule     VENTOLIN  (90 Base) MCG/ACT inhaler     No current facility-administered medications for this visit.       No Known Allergies    Social History     Socioeconomic History      Marital status:    Tobacco Use     Smoking status: Never Smoker     Smokeless tobacco: Never Used       No family history on file.       ROS: 10 point ROS neg other than the symptoms noted above in the HPI.    Vital Signs: There were no vitals taken for this visit.    Examination:  Constitutional:  Alert, well nourished, NAD.  HEENT: Normocephalic, atraumatic.   Pulmonary:  Without shortness of breath, normal effort.   Lymph: no lymphadenopathy to low back or LE.   Integumentary: Skin is free of rashes or lesions.   Cardiovascular:  No pitting edema of BLE.    Psych: Normal affect, no apparent distress    Neurological:  Awake  Alert  Oriented x 3  Speech clear  Cranial nerves II - XII grossly intact  Motor exam   Hip Flexor:                Right: 5/5  Left:  5/5  Hip Adductor:             Right:  5/5  Left:  5/5  Hip Abductor:             Right:  5/5  Left:  5/5  Gastroc Soleus:        Right:  5/5  Left:  5/5  Tib/Ant:                      Right:  5/5  Left:  5/5  EHL:                          Right:  5/5  Left:  5/5       Sensation normal to bilateral upper and lower extremities.    Reflexes are 2+ in the patellar and Achilles. There is no clonus. Downgoing Babinski.    Musculoskeletal:  Gait: Able to stand from a seated position. Normal non-antalgic, non-myelopathic gait.  Able to heel/toe walk without loss of balance      Imaging:       Assessment/Plan:     Right low back and right leg pain    Eneida Fermin is a 41 year old female.  I did have a discussion the patient regarding her symptoms.  She has been treated extensively both conservatively and surgically with [facility] orthopedics for her IT band syndrome.  Unfortunately, she has not noticed any symptomatic improvement in her right leg pain.  She has also had MRI of her hip, and this does not show any acute pathology.  At this point, I did recommend obtaining a lumbar spine MRI for further evaluation.  This could be due to some nerve  compression.  We will follow-up with her by phone once the MRI is completed.  She did wish to proceed with that option and voiced agreement and understanding.          Prabhjot Bah PA-C  [facility] Neurosurgery  [address] [address]    Tel [phone]  Pager [phone]      The use of Dragon/[employer] dictation services may have been used to construct the content in this note; any grammatical or spelling errors are non-intentional. Please contact the author of this note directly if you are in need of any clarification.

## 2022-05-08 ENCOUNTER — HEALTH MAINTENANCE LETTER (OUTPATIENT)
Age: 42
End: 2022-05-08

## 2022-05-09 ENCOUNTER — HOSPITAL ENCOUNTER (OUTPATIENT)
Dept: MRI IMAGING | Facility: CLINIC | Age: 42
Discharge: HOME | End: 2022-05-09
Attending: PHYSICIAN ASSISTANT | Admitting: PHYSICIAN ASSISTANT
Payer: COMMERCIAL

## 2022-05-09 DIAGNOSIS — M54.16 LUMBAR RADICULOPATHY: ICD-10-CM

## 2022-05-09 PROCEDURE — 72148 MRI LUMBAR SPINE W/O DYE: CPT

## 2022-05-10 ENCOUNTER — TELEPHONE (OUTPATIENT)
Dept: NEUROSURGERY | Facility: CLINIC | Age: 42
End: 2022-05-10
Payer: COMMERCIAL

## 2022-05-10 DIAGNOSIS — M54.16 LUMBAR RADICULOPATHY: Primary | ICD-10-CM

## 2022-05-10 NOTE — TELEPHONE ENCOUNTER
Please call pt to discuss her MRI results from 5/9 she stated she would like to know what the next step should be? . Thank You Martita

## 2022-05-13 ENCOUNTER — TELEPHONE (OUTPATIENT)
Dept: PALLIATIVE MEDICINE | Facility: CLINIC | Age: 42
End: 2022-05-13
Payer: COMMERCIAL

## 2022-05-13 NOTE — TELEPHONE ENCOUNTER
Pt scheduled for MAGALIS   Date:6/3/22  Time:230pm  Dr. Fontenot    Instructed pt to have H&P and  for procedure.  Patient informed of COVID testing process.

## 2022-05-23 ENCOUNTER — TRANSFERRED RECORDS (OUTPATIENT)
Dept: SURGERY | Facility: CLINIC | Age: 42
End: 2022-05-23
Payer: COMMERCIAL

## 2022-05-26 DIAGNOSIS — Z11.59 ENCOUNTER FOR SCREENING FOR OTHER VIRAL DISEASES: Primary | ICD-10-CM

## 2022-06-01 ENCOUNTER — LAB (OUTPATIENT)
Dept: LAB | Facility: CLINIC | Age: 42
End: 2022-06-01
Payer: COMMERCIAL

## 2022-06-01 DIAGNOSIS — Z11.59 ENCOUNTER FOR SCREENING FOR OTHER VIRAL DISEASES: ICD-10-CM

## 2022-06-01 LAB — SARS-COV-2 RNA RESP QL NAA+PROBE: NEGATIVE

## 2022-06-01 PROCEDURE — U0005 INFEC AGEN DETEC AMPLI PROBE: HCPCS

## 2022-06-01 PROCEDURE — U0003 INFECTIOUS AGENT DETECTION BY NUCLEIC ACID (DNA OR RNA); SEVERE ACUTE RESPIRATORY SYNDROME CORONAVIRUS 2 (SARS-COV-2) (CORONAVIRUS DISEASE [COVID-19]), AMPLIFIED PROBE TECHNIQUE, MAKING USE OF HIGH THROUGHPUT TECHNOLOGIES AS DESCRIBED BY CMS-2020-01-R: HCPCS

## 2022-06-03 ENCOUNTER — HOSPITAL ENCOUNTER (OUTPATIENT)
Dept: GENERAL RADIOLOGY | Facility: CLINIC | Age: 42
Discharge: HOME | End: 2022-06-03
Attending: ANESTHESIOLOGY | Admitting: ANESTHESIOLOGY
Payer: COMMERCIAL

## 2022-06-03 ENCOUNTER — ANESTHESIA (OUTPATIENT)
Dept: SURGERY | Facility: CLINIC | Age: 42
End: 2022-06-03
Payer: COMMERCIAL

## 2022-06-03 ENCOUNTER — ANESTHESIA EVENT (OUTPATIENT)
Dept: SURGERY | Facility: CLINIC | Age: 42
End: 2022-06-03
Payer: COMMERCIAL

## 2022-06-03 ENCOUNTER — HOSPITAL ENCOUNTER (OUTPATIENT)
Facility: CLINIC | Age: 42
Discharge: HOME | End: 2022-06-03
Attending: ANESTHESIOLOGY | Admitting: ANESTHESIOLOGY
Payer: COMMERCIAL

## 2022-06-03 VITALS
OXYGEN SATURATION: 100 % | RESPIRATION RATE: 18 BRPM | TEMPERATURE: 98.2 F | SYSTOLIC BLOOD PRESSURE: 102 MMHG | DIASTOLIC BLOOD PRESSURE: 56 MMHG | HEART RATE: 57 BPM

## 2022-06-03 DIAGNOSIS — M54.16 LUMBAR RADICULOPATHY: ICD-10-CM

## 2022-06-03 LAB — GLUCOSE BLDC GLUCOMTR-MCNC: 79 MG/DL (ref 70–99)

## 2022-06-03 PROCEDURE — 250N000011 HC RX IP 250 OP 636: Performed by: NURSE ANESTHETIST, CERTIFIED REGISTERED

## 2022-06-03 PROCEDURE — 999N000179 XR SURGERY CARM FLUORO LESS THAN 5 MIN W STILLS: Mod: TC

## 2022-06-03 PROCEDURE — 370N000017 HC ANESTHESIA TECHNICAL FEE, PER MIN: Performed by: ANESTHESIOLOGY

## 2022-06-03 PROCEDURE — 64483 NJX AA&/STRD TFRM EPI L/S 1: CPT | Mod: RT | Performed by: ANESTHESIOLOGY

## 2022-06-03 PROCEDURE — 250N000011 HC RX IP 250 OP 636: Performed by: ANESTHESIOLOGY

## 2022-06-03 PROCEDURE — 250N000009 HC RX 250: Performed by: NURSE ANESTHETIST, CERTIFIED REGISTERED

## 2022-06-03 PROCEDURE — 82962 GLUCOSE BLOOD TEST: CPT

## 2022-06-03 RX ORDER — LIDOCAINE 40 MG/G
CREAM TOPICAL
Status: DISCONTINUED | OUTPATIENT
Start: 2022-06-03 | End: 2022-06-03 | Stop reason: HOSPADM

## 2022-06-03 RX ORDER — IOPAMIDOL 612 MG/ML
INJECTION, SOLUTION INTRATHECAL PRN
Status: DISCONTINUED | OUTPATIENT
Start: 2022-06-03 | End: 2022-06-03 | Stop reason: HOSPADM

## 2022-06-03 RX ORDER — PROPOFOL 10 MG/ML
INJECTION, EMULSION INTRAVENOUS PRN
Status: DISCONTINUED | OUTPATIENT
Start: 2022-06-03 | End: 2022-06-03

## 2022-06-03 RX ORDER — LIDOCAINE HYDROCHLORIDE 20 MG/ML
INJECTION, SOLUTION INFILTRATION; PERINEURAL PRN
Status: DISCONTINUED | OUTPATIENT
Start: 2022-06-03 | End: 2022-06-03

## 2022-06-03 RX ORDER — TRIAMCINOLONE ACETONIDE 40 MG/ML
INJECTION, SUSPENSION INTRA-ARTICULAR; INTRAMUSCULAR PRN
Status: DISCONTINUED | OUTPATIENT
Start: 2022-06-03 | End: 2022-06-03 | Stop reason: HOSPADM

## 2022-06-03 RX ADMIN — PROPOFOL 50 MG: 10 INJECTION, EMULSION INTRAVENOUS at 14:40

## 2022-06-03 RX ADMIN — LIDOCAINE HYDROCHLORIDE 50 MG: 20 INJECTION, SOLUTION INFILTRATION; PERINEURAL at 14:40

## 2022-06-03 RX ADMIN — MIDAZOLAM 2 MG: 1 INJECTION INTRAMUSCULAR; INTRAVENOUS at 14:35

## 2022-06-03 RX ADMIN — PROPOFOL 50 MG: 10 INJECTION, EMULSION INTRAVENOUS at 14:41

## 2022-06-03 ASSESSMENT — LIFESTYLE VARIABLES: TOBACCO_USE: 1

## 2022-06-03 NOTE — ANESTHESIA POSTPROCEDURE EVALUATION
Patient: Eneida Fermin    Procedure: Procedure(s):  Right Lumbar 4 - 5Transforaminal Epidural Steroid Injection       Anesthesia Type:  MAC    Note:  Disposition: Outpatient   Postop Pain Control: Uneventful            Sign Out: Well controlled pain   PONV: No   Neuro/Psych: Uneventful            Sign Out: Acceptable/Baseline neuro status   Airway/Respiratory: Uneventful            Sign Out: Acceptable/Baseline resp. status   CV/Hemodynamics: Uneventful            Sign Out: Acceptable CV status   Other NRE: NONE   DID A NON-ROUTINE EVENT OCCUR? No    Event details/Postop Comments:  Pt was happy with anesthesia care.  No complications.  I will follow up with the pt if needed.           Last vitals:  Vitals Value Taken Time   /56 06/03/22 1502   Temp     Pulse 57 06/03/22 1502   Resp 18 06/03/22 1452   SpO2 100 % 06/03/22 1503   Vitals shown include unvalidated device data.    Electronically Signed By: YARIEL Murcia CRNA  Nivia 3, 2022  3:11 PM

## 2022-06-03 NOTE — ANESTHESIA CARE TRANSFER NOTE
Patient: Eneida Fermin    Procedure: Procedure(s):  Right Lumbar 4 - 5Transforaminal Epidural Steroid Injection       Diagnosis: Lumbar radiculopathy [M54.16]  Diagnosis Additional Information: No value filed.    Anesthesia Type:   MAC     Note:    Oropharynx: oropharynx clear of all foreign objects and spontaneously breathing  Level of Consciousness: drowsy  Oxygen Supplementation: nasal cannula    Independent Airway: airway patency satisfactory and stable  Dentition: dentition unchanged  Vital Signs Stable: post-procedure vital signs reviewed and stable  Report to RN Given: handoff report given  Patient transferred to: Phase II    Handoff Report: Identifed the Patient, Identified the Reponsible Provider, Reviewed the pertinent medical history, Discussed the surgical course, Reviewed Intra-OP anesthesia mangement and issues during anesthesia, Set expectations for post-procedure period and Allowed opportunity for questions and acknowledgement of understanding      Vitals:  Vitals Value Taken Time   /65 06/03/22 1452   Temp     Pulse 56 06/03/22 1452   Resp     SpO2 100 % 06/03/22 1456   Vitals shown include unvalidated device data.    Electronically Signed By: YARIEL Murcia CRNA  Nivia 3, 2022  2:58 PM

## 2022-06-03 NOTE — ANESTHESIA PREPROCEDURE EVALUATION
Anesthesia Pre-Procedure Evaluation    Patient: Eneida Fermin   MRN: [id number] : 1980        Procedure : Procedure(s):  Right Paracentral Translaminar Epidural Steroid Injection          History reviewed. No pertinent past medical history.   History reviewed. No pertinent surgical history.   No Known Allergies   Social History     Tobacco Use     Smoking status: Never Smoker     Smokeless tobacco: Never Used   Substance Use Topics     Alcohol use: Not on file      Wt Readings from Last 1 Encounters:   22 119 kg (262 lb 6.4 oz)        Anesthesia Evaluation   Pt has had prior anesthetic. Type: General and MAC.    No history of anesthetic complications       ROS/MED HX  ENT/Pulmonary:     (+) tobacco use, Past use, asthma     Neurologic:     (+) migraines,     Cardiovascular:  - neg cardiovascular ROS     METS/Exercise Tolerance: >4 METS    Hematologic:  - neg hematologic  ROS     Musculoskeletal:  - neg musculoskeletal ROS     GI/Hepatic:  - neg GI/hepatic ROS     Renal/Genitourinary:  - neg Renal ROS     Endo:     (+) thyroid problem, hypothyroidism, Obesity,     Psychiatric/Substance Use:     (+) psychiatric history anxiety and depression     Infectious Disease:  - neg infectious disease ROS     Malignancy:  - neg malignancy ROS     Other:  - neg other ROS          Physical Exam    Airway  airway exam normal      Mallampati: II   TM distance: > 3 FB   Neck ROM: full   Mouth opening: > 3 cm    Respiratory Devices and Support         Dental  no notable dental history         Cardiovascular   cardiovascular exam normal       Rhythm and rate: regular and normal     Pulmonary   pulmonary exam normal        breath sounds clear to auscultation           OUTSIDE LABS:  CBC:   Lab Results   Component Value Date    WBC 7.2 2021    HGB 12.9 2021    HCT 39.1 2021     2021     BMP:   Lab Results   Component Value Date     2021    POTASSIUM 4.0 2021    CHLORIDE 107  07/30/2021    CO2 26 07/30/2021    BUN 14 07/30/2021    CR 0.88 07/30/2021    GLC 76 07/30/2021     COAGS: No results found for: PTT, INR, FIBR  POC: No results found for: BGM, HCG, HCGS  HEPATIC:   Lab Results   Component Value Date    ALBUMIN 3.6 07/30/2021    PROTTOTAL 7.0 07/30/2021    ALT 37 07/30/2021    AST 21 07/30/2021    ALKPHOS 81 07/30/2021    BILITOTAL 0.6 07/30/2021     OTHER:   Lab Results   Component Value Date    A1C 4.8 07/30/2021    VELASQUEZ 8.7 07/30/2021    LIPASE 155 07/30/2021    TSH 7.32 (H) 07/30/2021    T4 0.95 07/30/2021       Anesthesia Plan    ASA Status:  2   NPO Status:  NPO Appropriate    Anesthesia Type: MAC.     - Reason for MAC: Deep or markedly invasive procedure (G8)   Induction: Intravenous.   Maintenance: TIVA.        Consents    Anesthesia Plan(s) and associated risks, benefits, and realistic alternatives discussed. Questions answered and patient/representative(s) expressed understanding.    - Discussed:     - Discussed with:  Patient      - Extended Intubation/Ventilatory Support Discussed: No.      - Patient is DNR/DNI Status: No    Use of blood products discussed: No .     Postoperative Care    Pain management: Multi-modal analgesia.   PONV prophylaxis: Background Propofol Infusion     Comments:    Other Comments: The risks and benefits of anesthesia, and the alternatives where applicable, have been discussed with the patient, and they wish to proceed.            YARIEL Murcia CRNA

## 2022-06-03 NOTE — DISCHARGE INSTRUCTIONS

## 2022-06-03 NOTE — OP NOTE
PRIMARY PROBLEM: Low back pain, right buttock pain, right lower extremity pain    PROCEDURE: Right L4-5  Transforaminal Epidural Steroid Injection with fluoroscopic guidance and contrast.     PROCEDURE DETAILS: After written informed consent was obtained from the patient, the patient was escorted to the procedure room.  The patient was placed in the prone position.  A  time out  was conducted to verify patient identity, procedure to be performed, side, site, allergies and any special requirements.  The skin over the thoracolumbar region was prepped and draped in normal sterile fashion with ChloraPrep. Fluoroscopy was used to identify the neural foramen in AP view and the skin was anesthetized with 2 mL of 1% lidocaine with bicarbonate buffer. A 22-gauge Quincke spinal needle was advanced through this location and advanced under fluoroscopic guidance towards the neural foramen.  The target zone was the 6 o clock position of the pedicle.   Prior to entering the foramen, the depth of the needle was gauged with a lateral view on fluoroscopy. While still in a lateral view, the needle was slowly advanced to avoid injury to the spinal nerve.  Then, in the oblique view (approximately 28 degrees), after negative aspiration, 1.5 mL of Omnipaque contrast dye was injected revealing epidural spread without evidence of intravascular or intrathecal spread.  Then a 3cc solution of 40 mg of Triamcinolone in 2 mL of  Preservative-Free saline was slowly injected into the epidural space at each segment.  After injection of the medication, as the needle tip was withdrawn, it was flushed with local anesthetic.   The patient was monitored with blood pressure and pulse oximetry machines with the assistance of an RN throughout the procedure.  The patient was alert and responsive to questions throughout the procedure.   The patient tolerated the procedure well and was observed in the post-procedural area.  The patient was dismissed without  apparent complications.     BLOOD LOSS: < 5 cc    DIAGNOSIS:  1.  Right L4-5 disc herniation versus right SI joint dysfunction causing back pain and leg pain    PLAN:  1. Performed a right L4-5  transforaminal epidural steroid injection.  2. The patient was instructed to follow-up per Dr. Fontenot's instructions.  If today's injections not helpful I recommend a right SI joint injection as she primarily has right buttock pain with some minor radiation into the posterior thigh which is much more of a minor complaint.  Often times that comes with SI joint dysfunction.    Ellis Fontenot MD  Diplomate of the American Board of Anesthesiology, Pain Medicine

## 2022-07-26 ENCOUNTER — TELEPHONE (OUTPATIENT)
Dept: FAMILY MEDICINE | Facility: CLINIC | Age: 42
End: 2022-07-26

## 2022-07-26 NOTE — TELEPHONE ENCOUNTER
Patient calling reporting that back pain is still continuing after getting injections with Dr. Fontenot. They advised her to return to Dr. Barrera to discuss a referral.     Patient is scheduled tomorrow at 3:40pm.    SELMA ValdezN, RN

## 2022-07-27 ENCOUNTER — OFFICE VISIT (OUTPATIENT)
Dept: FAMILY MEDICINE | Facility: CLINIC | Age: 42
End: 2022-07-27
Payer: COMMERCIAL

## 2022-07-27 VITALS
SYSTOLIC BLOOD PRESSURE: 106 MMHG | BODY MASS INDEX: 38.15 KG/M2 | DIASTOLIC BLOOD PRESSURE: 64 MMHG | RESPIRATION RATE: 20 BRPM | WEIGHT: 257.56 LBS | HEART RATE: 77 BPM | OXYGEN SATURATION: 100 % | TEMPERATURE: 97.8 F | HEIGHT: 69 IN

## 2022-07-27 DIAGNOSIS — M76.31 IT BAND SYNDROME, RIGHT: ICD-10-CM

## 2022-07-27 DIAGNOSIS — M53.3 SI (SACROILIAC) JOINT DYSFUNCTION: Primary | ICD-10-CM

## 2022-07-27 PROCEDURE — 99213 OFFICE O/P EST LOW 20 MIN: CPT | Performed by: STUDENT IN AN ORGANIZED HEALTH CARE EDUCATION/TRAINING PROGRAM

## 2022-07-27 RX ORDER — LEVOTHYROXINE SODIUM 150 UG/1
150 TABLET ORAL
COMMUNITY

## 2022-07-27 RX ORDER — LEVOTHYROXINE SODIUM 25 UG/1
TABLET ORAL
COMMUNITY
Start: 2021-10-01

## 2022-07-27 RX ORDER — PROCHLORPERAZINE 25 MG/1
SUPPOSITORY RECTAL
COMMUNITY
Start: 2022-07-18

## 2022-07-27 ASSESSMENT — ENCOUNTER SYMPTOMS: BACK PAIN: 1

## 2022-07-27 ASSESSMENT — PAIN SCALES - GENERAL: PAINLEVEL: EXTREME PAIN (8)

## 2022-07-27 NOTE — H&P (VIEW-ONLY)
"  Assessment & Plan     SI (sacroiliac) joint dysfunction  Patient with previous IT band procedure. No improvement with back pain since epidural injection. I think she would benefit greatly from SI joint injection. Will plan for her to follow up with Dr. Fontenot for this now.   - IR Sacroiliac Diagnostic Injection Right    It band syndrome, right      BMI:   Estimated body mass index is 38.04 kg/m  as calculated from the following:    Height as of this encounter: 1.753 m (5' 9\").    Weight as of this encounter: 116.8 kg (257 lb 9 oz).   Weight management plan: Discussed healthy diet and exercise guidelines      Roverto Barrera MD  [facility] AZAR Monique is a 41 year old presenting for the following health issues:  Back Pain      Back Pain     History of Present Illness       Reason for visit:  Back pain    She eats 4 or more servings of fruits and vegetables daily.She consumes 0 sweetened beverage(s) daily.She exercises with enough effort to increase her heart rate 9 or less minutes per day.  She exercises with enough effort to increase her heart rate 3 or less days per week.   She is taking medications regularly.       Continues to have symptoms since previous injection.  Mostly in her right side low back.  Does still have right hip tenderness as well mostly low back that is contributing to her discomfort.    Review of Systems   Musculoskeletal: Positive for back pain.       Constitutional, HEENT, cardiovascular, pulmonary, gi and gu systems are negative, except as otherwise noted.      Objective    /64   Pulse 77   Temp 97.8  F (36.6  C) (Temporal)   Resp 20   Ht 1.753 m (5' 9\")   Wt 116.8 kg (257 lb 9 oz)   LMP  (LMP Unknown)   SpO2 100%   Breastfeeding No   BMI 38.04 kg/m    Body mass index is 38.04 kg/m .  Physical Exam   GENERAL: healthy, alert and no distress  EYES: Eyes grossly normal to inspection, PERRL and conjunctivae and sclerae normal  RESP: " Breathing normally on room air  CV: No lower extremity edema  MS: no gross musculoskeletal defects noted, right-sided IT band tenderness and greater trochanteric tenderness to palpation, no left-sided tenderness, does have mild bilateral lumbar paraspinal tenderness with significant right-sided SI joint tenderness to palpation negative straight leg bilaterally  SKIN: no suspicious lesions or rashes  NEURO: Normal strength and tone, mentation intact and speech normal  PSYCH: mentation appears normal, affect normal/bright              .  ..

## 2022-07-27 NOTE — PROGRESS NOTES
"  Assessment & Plan     SI (sacroiliac) joint dysfunction  Patient with previous IT band procedure. No improvement with back pain since epidural injection. I think she would benefit greatly from SI joint injection. Will plan for her to follow up with Dr. Fontenot for this now.   - IR Sacroiliac Diagnostic Injection Right    It band syndrome, right      BMI:   Estimated body mass index is 38.04 kg/m  as calculated from the following:    Height as of this encounter: 1.753 m (5' 9\").    Weight as of this encounter: 116.8 kg (257 lb 9 oz).   Weight management plan: Discussed healthy diet and exercise guidelines      Roverto Barrera MD  [facility] AZAR Monique is a 41 year old presenting for the following health issues:  Back Pain      Back Pain     History of Present Illness       Reason for visit:  Back pain    She eats 4 or more servings of fruits and vegetables daily.She consumes 0 sweetened beverage(s) daily.She exercises with enough effort to increase her heart rate 9 or less minutes per day.  She exercises with enough effort to increase her heart rate 3 or less days per week.   She is taking medications regularly.       Continues to have symptoms since previous injection.  Mostly in her right side low back.  Does still have right hip tenderness as well mostly low back that is contributing to her discomfort.    Review of Systems   Musculoskeletal: Positive for back pain.       Constitutional, HEENT, cardiovascular, pulmonary, gi and gu systems are negative, except as otherwise noted.      Objective    /64   Pulse 77   Temp 97.8  F (36.6  C) (Temporal)   Resp 20   Ht 1.753 m (5' 9\")   Wt 116.8 kg (257 lb 9 oz)   LMP  (LMP Unknown)   SpO2 100%   Breastfeeding No   BMI 38.04 kg/m    Body mass index is 38.04 kg/m .  Physical Exam   GENERAL: healthy, alert and no distress  EYES: Eyes grossly normal to inspection, PERRL and conjunctivae and sclerae normal  RESP: " Breathing normally on room air  CV: No lower extremity edema  MS: no gross musculoskeletal defects noted, right-sided IT band tenderness and greater trochanteric tenderness to palpation, no left-sided tenderness, does have mild bilateral lumbar paraspinal tenderness with significant right-sided SI joint tenderness to palpation negative straight leg bilaterally  SKIN: no suspicious lesions or rashes  NEURO: Normal strength and tone, mentation intact and speech normal  PSYCH: mentation appears normal, affect normal/bright              .  ..

## 2022-08-01 ENCOUNTER — TELEPHONE (OUTPATIENT)
Dept: PALLIATIVE MEDICINE | Facility: CLINIC | Age: 42
End: 2022-08-01

## 2022-08-01 ENCOUNTER — MYC MEDICAL ADVICE (OUTPATIENT)
Dept: FAMILY MEDICINE | Facility: CLINIC | Age: 42
End: 2022-08-01

## 2022-08-01 NOTE — TELEPHONE ENCOUNTER
Pt scheduled for SJI  Date: 8/25/22  Time: 230pm  Dr. Fontenot    Instructed pt to have H&P and  for procedure.  Patient informed of COVID testing process.

## 2022-08-15 NOTE — TELEPHONE ENCOUNTER
Per patient, home covid  test on 8/11/22 came back positive.  No symptoms except cough.  Ok to keep appt since it will be 14 days from test to procedure.

## 2022-08-18 ENCOUNTER — MYC MEDICAL ADVICE (OUTPATIENT)
Dept: SURGERY | Facility: CLINIC | Age: 42
End: 2022-08-18

## 2022-08-24 ENCOUNTER — ANESTHESIA EVENT (OUTPATIENT)
Dept: SURGERY | Facility: CLINIC | Age: 42
End: 2022-08-24
Payer: COMMERCIAL

## 2022-08-24 ASSESSMENT — LIFESTYLE VARIABLES: TOBACCO_USE: 1

## 2022-08-25 ENCOUNTER — HOSPITAL ENCOUNTER (OUTPATIENT)
Facility: CLINIC | Age: 42
Discharge: HOME | End: 2022-08-25
Attending: ANESTHESIOLOGY | Admitting: ANESTHESIOLOGY
Payer: COMMERCIAL

## 2022-08-25 ENCOUNTER — ANESTHESIA (OUTPATIENT)
Dept: SURGERY | Facility: CLINIC | Age: 42
End: 2022-08-25
Payer: COMMERCIAL

## 2022-08-25 ENCOUNTER — HOSPITAL ENCOUNTER (OUTPATIENT)
Dept: GENERAL RADIOLOGY | Facility: CLINIC | Age: 42
Discharge: HOME | End: 2022-08-25
Attending: ANESTHESIOLOGY | Admitting: ANESTHESIOLOGY
Payer: COMMERCIAL

## 2022-08-25 VITALS
SYSTOLIC BLOOD PRESSURE: 95 MMHG | DIASTOLIC BLOOD PRESSURE: 77 MMHG | BODY MASS INDEX: 38.06 KG/M2 | HEART RATE: 55 BPM | HEIGHT: 69 IN | WEIGHT: 257 LBS | RESPIRATION RATE: 16 BRPM | OXYGEN SATURATION: 100 % | TEMPERATURE: 97.5 F

## 2022-08-25 DIAGNOSIS — M53.3 SI (SACROILIAC) JOINT DYSFUNCTION: ICD-10-CM

## 2022-08-25 PROCEDURE — 27096 INJECT SACROILIAC JOINT: CPT | Performed by: ANESTHESIOLOGY

## 2022-08-25 PROCEDURE — 370N000017 HC ANESTHESIA TECHNICAL FEE, PER MIN: Performed by: ANESTHESIOLOGY

## 2022-08-25 PROCEDURE — 250N000011 HC RX IP 250 OP 636: Performed by: ANESTHESIOLOGY

## 2022-08-25 PROCEDURE — 999N000179 XR SURGERY CARM FLUORO LESS THAN 5 MIN W STILLS: Mod: TC

## 2022-08-25 PROCEDURE — 250N000009 HC RX 250: Performed by: ANESTHESIOLOGY

## 2022-08-25 PROCEDURE — 250N000009 HC RX 250: Performed by: NURSE ANESTHETIST, CERTIFIED REGISTERED

## 2022-08-25 PROCEDURE — 27096 INJECT SACROILIAC JOINT: CPT | Mod: RT | Performed by: ANESTHESIOLOGY

## 2022-08-25 PROCEDURE — 250N000011 HC RX IP 250 OP 636: Performed by: NURSE ANESTHETIST, CERTIFIED REGISTERED

## 2022-08-25 RX ORDER — IOPAMIDOL 612 MG/ML
INJECTION, SOLUTION INTRATHECAL PRN
Status: DISCONTINUED | OUTPATIENT
Start: 2022-08-25 | End: 2022-08-25 | Stop reason: HOSPADM

## 2022-08-25 RX ORDER — LIDOCAINE HYDROCHLORIDE 20 MG/ML
INJECTION, SOLUTION INFILTRATION; PERINEURAL PRN
Status: DISCONTINUED | OUTPATIENT
Start: 2022-08-25 | End: 2022-08-25

## 2022-08-25 RX ORDER — LIDOCAINE 40 MG/G
CREAM TOPICAL
Status: DISCONTINUED | OUTPATIENT
Start: 2022-08-25 | End: 2022-08-25 | Stop reason: HOSPADM

## 2022-08-25 RX ORDER — PROPOFOL 10 MG/ML
INJECTION, EMULSION INTRAVENOUS PRN
Status: DISCONTINUED | OUTPATIENT
Start: 2022-08-25 | End: 2022-08-25

## 2022-08-25 RX ORDER — SODIUM CHLORIDE, SODIUM LACTATE, POTASSIUM CHLORIDE, CALCIUM CHLORIDE 600; 310; 30; 20 MG/100ML; MG/100ML; MG/100ML; MG/100ML
INJECTION, SOLUTION INTRAVENOUS CONTINUOUS
Status: DISCONTINUED | OUTPATIENT
Start: 2022-08-25 | End: 2022-08-25 | Stop reason: HOSPADM

## 2022-08-25 RX ORDER — BUPIVACAINE HYDROCHLORIDE 5 MG/ML
INJECTION, SOLUTION PERINEURAL PRN
Status: DISCONTINUED | OUTPATIENT
Start: 2022-08-25 | End: 2022-08-25 | Stop reason: HOSPADM

## 2022-08-25 RX ORDER — PREDNISONE 20 MG/1
40 TABLET ORAL 2 TIMES DAILY
COMMUNITY

## 2022-08-25 RX ORDER — TRIAMCINOLONE ACETONIDE 40 MG/ML
INJECTION, SUSPENSION INTRA-ARTICULAR; INTRAMUSCULAR PRN
Status: DISCONTINUED | OUTPATIENT
Start: 2022-08-25 | End: 2022-08-25 | Stop reason: HOSPADM

## 2022-08-25 RX ADMIN — PROPOFOL 50 MG: 10 INJECTION, EMULSION INTRAVENOUS at 14:55

## 2022-08-25 RX ADMIN — PROPOFOL 30 MG: 10 INJECTION, EMULSION INTRAVENOUS at 15:02

## 2022-08-25 RX ADMIN — LIDOCAINE HYDROCHLORIDE 60 MG: 20 INJECTION, SOLUTION INFILTRATION; PERINEURAL at 14:55

## 2022-08-25 RX ADMIN — PROPOFOL 40 MG: 10 INJECTION, EMULSION INTRAVENOUS at 14:59

## 2022-08-25 ASSESSMENT — ACTIVITIES OF DAILY LIVING (ADL): ADLS_ACUITY_SCORE: 35

## 2022-08-25 NOTE — DISCHARGE INSTRUCTIONS

## 2022-08-25 NOTE — ANESTHESIA CARE TRANSFER NOTE
Patient: Eneida Fermin    Procedure: Procedure(s):  Right sacroiliac joint injection       Diagnosis: SI (sacroiliac) joint dysfunction [M53.3]  Diagnosis Additional Information: No value filed.    Anesthesia Type:   MAC     Note:    Oropharynx: oropharynx clear of all foreign objects and spontaneously breathing  Level of Consciousness: drowsy  Oxygen Supplementation: room air    Independent Airway: airway patency satisfactory and stable  Dentition: dentition unchanged  Vital Signs Stable: post-procedure vital signs reviewed and stable  Report to RN Given: handoff report given  Patient transferred to: Phase II    Handoff Report: Identifed the Patient, Identified the Reponsible Provider, Reviewed the pertinent medical history, Discussed the surgical course, Reviewed Intra-OP anesthesia mangement and issues during anesthesia, Set expectations for post-procedure period and Allowed opportunity for questions and acknowledgement of understanding      Vitals:  Vitals Value Taken Time   /64 08/25/22 1510   Temp     Pulse 51 08/25/22 1510   Resp     SpO2 100 % 08/25/22 1512   Vitals shown include unvalidated device data.    Electronically Signed By: YARIEL Bermudez CRNA  August 25, 2022  3:13 PM

## 2022-08-25 NOTE — ANESTHESIA PREPROCEDURE EVALUATION
Anesthesia Pre-Procedure Evaluation    Patient: Eneida Fermin   MRN: [id number] : 1980        Procedure : Procedure(s):  Right Paracentral Translaminar Epidural Steroid Injection          No past medical history on file.   Past Surgical History:   Procedure Laterality Date     INJECT EPIDURAL LUMBAR Right 6/3/2022    Procedure: Right Lumbar 4-5 Transforaminal Epidural Steroid Injection with fluoroscopic guidance and contrast;  Surgeon: Ellis Fontenot MD;  Location: [facility]      Allergies   Allergen Reactions     Flu Virus Vaccine Anaphylaxis     Other reaction(s): Respiratory Distress  Throat swelling       Hydrocodone-Acetaminophen Hives and Nausea     Oxycodone-Acetaminophen Hives      Social History     Tobacco Use     Smoking status: Never Smoker     Smokeless tobacco: Never Used   Substance Use Topics     Alcohol use: Not on file      Wt Readings from Last 1 Encounters:   22 116.8 kg (257 lb 9 oz)        Anesthesia Evaluation   Pt has had prior anesthetic. Type: General and MAC.    No history of anesthetic complications       ROS/MED HX  ENT/Pulmonary:     (+) tobacco use, Past use, asthma     Neurologic:     (+) migraines,     Cardiovascular:  - neg cardiovascular ROS     METS/Exercise Tolerance: >4 METS    Hematologic:  - neg hematologic  ROS     Musculoskeletal: Comment: Right sided SI joint pain  (+) cervical spine instability.     GI/Hepatic:  - neg GI/hepatic ROS     Renal/Genitourinary:  - neg Renal ROS     Endo:     (+) thyroid problem, hypothyroidism, Obesity,     Psychiatric/Substance Use:     (+) psychiatric history anxiety and depression     Infectious Disease:  - neg infectious disease ROS     Malignancy:  - neg malignancy ROS     Other:  - neg other ROS          Physical Exam    Airway  airway exam normal      Mallampati: II   TM distance: > 3 FB   Neck ROM: full   Mouth opening: > 3 cm    Respiratory Devices and Support         Dental  no notable dental history          Cardiovascular   cardiovascular exam normal       Rhythm and rate: regular and normal     Pulmonary   pulmonary exam normal        breath sounds clear to auscultation           OUTSIDE LABS:  CBC:   Lab Results   Component Value Date    WBC 7.2 07/30/2021    HGB 12.9 07/30/2021    HCT 39.1 07/30/2021     07/30/2021     BMP:   Lab Results   Component Value Date     07/30/2021    POTASSIUM 4.0 07/30/2021    CHLORIDE 107 07/30/2021    CO2 26 07/30/2021    BUN 14 07/30/2021    CR 0.88 07/30/2021    GLC 79 06/03/2022    GLC 76 07/30/2021     COAGS: No results found for: PTT, INR, FIBR  POC: No results found for: BGM, HCG, HCGS  HEPATIC:   Lab Results   Component Value Date    ALBUMIN 3.6 07/30/2021    PROTTOTAL 7.0 07/30/2021    ALT 37 07/30/2021    AST 21 07/30/2021    ALKPHOS 81 07/30/2021    BILITOTAL 0.6 07/30/2021     OTHER:   Lab Results   Component Value Date    A1C 4.8 07/30/2021    VELASQUEZ 8.7 07/30/2021    LIPASE 155 07/30/2021    TSH 7.32 (H) 07/30/2021    T4 0.95 07/30/2021       Anesthesia Plan    ASA Status:  2   NPO Status:  NPO Appropriate    Anesthesia Type: MAC.     - Reason for MAC: Deep or markedly invasive procedure (G8)   Induction: Intravenous.   Maintenance: TIVA.        Consents    Anesthesia Plan(s) and associated risks, benefits, and realistic alternatives discussed. Questions answered and patient/representative(s) expressed understanding.    - Discussed:     - Discussed with:  Patient      - Extended Intubation/Ventilatory Support Discussed: No.      - Patient is DNR/DNI Status: No    Use of blood products discussed: No .     Postoperative Care    Pain management: Multi-modal analgesia.   PONV prophylaxis: Background Propofol Infusion     Comments:    Other Comments: The risks and benefits of anesthesia, and the alternatives where applicable, have been discussed with the patient, and they wish to proceed.                YARIEL Bermudez CRNA

## 2022-08-25 NOTE — ANESTHESIA POSTPROCEDURE EVALUATION
Patient: Eneida Fermin    Procedure: Procedure(s):  Right sacroiliac joint injection       Anesthesia Type:  MAC    Note:  Disposition: Outpatient   Postop Pain Control: Uneventful            Sign Out: Well controlled pain   PONV: No   Neuro/Psych: Uneventful            Sign Out: Acceptable/Baseline neuro status   Airway/Respiratory: Uneventful            Sign Out: Acceptable/Baseline resp. status   CV/Hemodynamics: Uneventful            Sign Out: Acceptable CV status   Other NRE: NONE   DID A NON-ROUTINE EVENT OCCUR? No    Event details/Postop Comments:  Pt was happy with anesthesia care.  No complications.  I will follow up with the pt if needed.           Last vitals:  Vitals:    08/25/22 1333   BP: 114/70   Pulse: 50   Resp: 18   Temp: 97.5  F (36.4  C)   SpO2: 99%       Electronically Signed By: YARIEL Bermudez CRNA  August 25, 2022  3:12 PM

## 2022-08-25 NOTE — ANESTHESIA CARE TRANSFER NOTE
Patient: Eneida Fermin    Procedure: Procedure(s):  Right sacroiliac joint injection       Diagnosis: SI (sacroiliac) joint dysfunction [M53.3]  Diagnosis Additional Information: No value filed.    Anesthesia Type:   MAC     Note:    Oropharynx: oropharynx clear of all foreign objects and spontaneously breathing  Level of Consciousness: drowsy  Oxygen Supplementation: room air    Independent Airway: airway patency satisfactory and stable  Dentition: dentition unchanged  Vital Signs Stable: post-procedure vital signs reviewed and stable  Report to RN Given: handoff report given  Patient transferred to: Phase II    Handoff Report: Identifed the Patient, Identified the Reponsible Provider, Reviewed the pertinent medical history, Discussed the surgical course, Reviewed Intra-OP anesthesia mangement and issues during anesthesia, Set expectations for post-procedure period and Allowed opportunity for questions and acknowledgement of understanding      Vitals:  Vitals Value Taken Time   BP     Temp     Pulse     Resp     SpO2 100 % 08/25/22 1511   Vitals shown include unvalidated device data.    Electronically Signed By: YARIEL Bermudez CRNA  August 25, 2022  3:12 PM

## 2022-08-29 NOTE — OP NOTE
CHIEF COMPLAINT:    1.SI joint dysfunction (Sacroilitis) and pain (724.6)   2 Sacral enthesopathy (720.1)    PROCEDURE:   Fluoroscopically-guided injection of the Right  sacroiliac joint with Right german Sacroiliac joint ligaments infiltration over the sacrum.    PROCEDURE DETAILS: After written informed consent was obtained from the patient, the patient was escorted to the procedure room.  The patient was placed in the prone position.   A time out was conducted to verify patient identity, procedure to be performed, side, site, allergies and any special requirements.  The skin over the lumbosacral region was prepped and draped in normal sterile fashion with ChloraPrep. Fluoroscopy was used to identify the posteroinferior region of the sacroiliac joint.  The skin was anesthetized with 2 mL of 1% lidocaine with bicarbonate buffer.  Using fluoroscopic guidance, a 22 gauge, 3.5  Quincke spinal needle was advanced into the joint from an inferior approach.  After negative aspiration, 0.5 ml of Omnipaque contrast was injected showing intra-articular spread of contrast without evidence of intravascular spread.  2.0 mL of solution consisting of 40 mg of Depo-Medrol and 1.5 cc of 0.5% marcaine was injected slowly into the joint.   A second injection at the sacroiliac joint ligaments was then performed.  The middle third of the SI Joint was identified with fluoroscopy. After advancing a 22 gauge, 3.5  Quincke spinal needle to these ligaments with intermittent fluoroscopic guidance,  3 mL of solution consisting of  20 mg of DepoMedrol and 2.75 mL of 0.5 Marcaine was injected periligamentous and intramuscular over the sacroiliac joint ligaments.    The patient was monitored with blood pressure and pulse oximetry machines with the assistance of an RN throughout the procedure.  The patient was alert and responsive to questions throughout the procedure.   The patient tolerated the procedure well and was observed in the  post-procedural area.  The patient was dismissed without apparent complications.     DIAGNOSIS:  1.  Right sacroilitis  2.  Right sacral enthesopathy  PLAN:  1. Performed a Right  Sacroiliac joint injection.  2. Right SI ligament injections over the sacrum   3. The patient was instructed to fill out a pain form reflecting the local anesthetic phase of the injection and follow-up per Dr. Fontenot's instructions.     Ellis Fontenot MD  Diplomate of the [employer] Board of Anesthesiology, Pain Medicine

## 2023-01-14 ENCOUNTER — HEALTH MAINTENANCE LETTER (OUTPATIENT)
Age: 43
End: 2023-01-14

## 2023-06-02 ENCOUNTER — HEALTH MAINTENANCE LETTER (OUTPATIENT)
Age: 43
End: 2023-06-02

## 2023-11-21 ENCOUNTER — LAB REQUISITION (OUTPATIENT)
Dept: LAB | Facility: CLINIC | Age: 43
End: 2023-11-21

## 2023-11-21 PROCEDURE — 87491 CHLMYD TRACH DNA AMP PROBE: CPT | Performed by: STUDENT IN AN ORGANIZED HEALTH CARE EDUCATION/TRAINING PROGRAM

## 2023-11-23 LAB
C TRACH DNA SPEC QL PROBE+SIG AMP: NEGATIVE
N GONORRHOEA DNA SPEC QL NAA+PROBE: NEGATIVE

## 2024-01-29 ENCOUNTER — ANCILLARY ORDERS (OUTPATIENT)
Dept: GENERAL RADIOLOGY | Facility: CLINIC | Age: 44
End: 2024-01-29

## 2024-01-29 ENCOUNTER — ANCILLARY ORDERS (OUTPATIENT)
Dept: NUCLEAR MEDICINE | Facility: CLINIC | Age: 44
End: 2024-01-29

## 2024-01-29 DIAGNOSIS — R13.10 DYSPHAGIA, UNSPECIFIED TYPE: Primary | ICD-10-CM

## 2024-01-29 DIAGNOSIS — R68.81 EARLY SATIETY: ICD-10-CM

## 2024-01-29 DIAGNOSIS — R11.10 REGURGITATION OF FOOD: ICD-10-CM

## 2024-02-11 ENCOUNTER — HEALTH MAINTENANCE LETTER (OUTPATIENT)
Age: 44
End: 2024-02-11

## 2024-02-23 ENCOUNTER — HOSPITAL ENCOUNTER (OUTPATIENT)
Dept: NUCLEAR MEDICINE | Facility: CLINIC | Age: 44
Setting detail: NUCLEAR MEDICINE
Discharge: HOME | End: 2024-02-23
Attending: PHYSICIAN ASSISTANT | Admitting: PHYSICIAN ASSISTANT
Payer: COMMERCIAL

## 2024-02-23 DIAGNOSIS — R68.81 EARLY SATIETY: ICD-10-CM

## 2024-02-23 DIAGNOSIS — R13.10 DYSPHAGIA, UNSPECIFIED TYPE: ICD-10-CM

## 2024-02-23 DIAGNOSIS — R11.10 REGURGITATION OF FOOD: ICD-10-CM

## 2024-02-23 PROCEDURE — 78264 GASTRIC EMPTYING IMG STUDY: CPT | Mod: 26 | Performed by: RADIOLOGY

## 2024-02-23 PROCEDURE — 343N000001 HC RX 343: Performed by: PHYSICIAN ASSISTANT

## 2024-02-23 PROCEDURE — 78264 GASTRIC EMPTYING IMG STUDY: CPT

## 2024-02-23 PROCEDURE — A9541 TC99M SULFUR COLLOID: HCPCS | Performed by: PHYSICIAN ASSISTANT

## 2024-02-23 RX ADMIN — Medication 2 MILLICURIE: at 08:42

## 2024-02-27 ENCOUNTER — HOSPITAL ENCOUNTER (OUTPATIENT)
Dept: GENERAL RADIOLOGY | Facility: CLINIC | Age: 44
Discharge: HOME | End: 2024-02-27
Attending: PHYSICIAN ASSISTANT | Admitting: PHYSICIAN ASSISTANT
Payer: COMMERCIAL

## 2024-02-27 DIAGNOSIS — R11.10 REGURGITATION OF FOOD: ICD-10-CM

## 2024-02-27 DIAGNOSIS — R68.81 EARLY SATIETY: ICD-10-CM

## 2024-02-27 DIAGNOSIS — R13.10 DYSPHAGIA, UNSPECIFIED TYPE: ICD-10-CM

## 2024-02-27 PROCEDURE — 250N000013 HC RX MED GY IP 250 OP 250 PS 637: Performed by: RADIOLOGY

## 2024-02-27 PROCEDURE — 74221 X-RAY XM ESOPHAGUS 2CNTRST: CPT

## 2024-02-27 RX ADMIN — ANTACID/ANTIFLATULENT 4 G: 380; 550; 10; 10 GRANULE, EFFERVESCENT ORAL at 09:55

## 2024-06-30 ENCOUNTER — HEALTH MAINTENANCE LETTER (OUTPATIENT)
Age: 44
End: 2024-06-30

## 2025-07-13 ENCOUNTER — HEALTH MAINTENANCE LETTER (OUTPATIENT)
Age: 45
End: 2025-07-13

## (undated) DEVICE — TRAY PROCEDURE SUPPORT PAIN MANAGEMENT 332114

## (undated) DEVICE — SPINOCAN SPINAL NEEDLE

## (undated) DEVICE — PREP CHLORAPREP 26ML TINTED ORANGE  260815

## (undated) DEVICE — SYR 10ML LL W/O NDL

## (undated) DEVICE — NDL SPINAL 22GA 5" QUINCKE 405148

## (undated) DEVICE — TUBING IV EXTENSION SET 34"

## (undated) DEVICE — NEEDLE SPINAL DISP 22GA X 5" QUINCKE 3333355

## (undated) DEVICE — GLOVE PROTEXIS W/NEU-THERA 7.5  2D73TE75

## (undated) DEVICE — SYR 05ML LL W/O NDL

## (undated) DEVICE — NDL SPINAL 22GA 3.5" QUINCKE 405181

## (undated) RX ORDER — PROPOFOL 10 MG/ML
INJECTION, EMULSION INTRAVENOUS
Status: DISPENSED
Start: 2022-06-03